# Patient Record
Sex: FEMALE | Race: WHITE | NOT HISPANIC OR LATINO | URBAN - METROPOLITAN AREA
[De-identification: names, ages, dates, MRNs, and addresses within clinical notes are randomized per-mention and may not be internally consistent; named-entity substitution may affect disease eponyms.]

---

## 2018-04-02 ENCOUNTER — EMERGENCY (EMERGENCY)
Facility: HOSPITAL | Age: 38
LOS: 1 days | Discharge: HOME | End: 2018-04-02
Attending: EMERGENCY MEDICINE
Payer: COMMERCIAL

## 2018-04-02 VITALS
HEART RATE: 83 BPM | TEMPERATURE: 97 F | RESPIRATION RATE: 18 BRPM | DIASTOLIC BLOOD PRESSURE: 92 MMHG | SYSTOLIC BLOOD PRESSURE: 135 MMHG | OXYGEN SATURATION: 97 %

## 2018-04-02 LAB
APPEARANCE UR: CLEAR — SIGNIFICANT CHANGE UP
BACTERIA # UR AUTO: ABNORMAL /HPF
BILIRUB UR-MCNC: NEGATIVE — SIGNIFICANT CHANGE UP
COLOR SPEC: YELLOW — SIGNIFICANT CHANGE UP
DIFF PNL FLD: ABNORMAL
EPI CELLS # UR: ABNORMAL /HPF
GLUCOSE UR QL: NEGATIVE — SIGNIFICANT CHANGE UP
KETONES UR-MCNC: NEGATIVE — SIGNIFICANT CHANGE UP
LEUKOCYTE ESTERASE UR-ACNC: ABNORMAL
NITRITE UR-MCNC: NEGATIVE — SIGNIFICANT CHANGE UP
PH UR: 7 — SIGNIFICANT CHANGE UP (ref 5–8)
PROT UR-MCNC: NEGATIVE — SIGNIFICANT CHANGE UP
RBC CASTS # UR COMP ASSIST: SIGNIFICANT CHANGE UP /HPF (ref 0–2)
SP GR SPEC: 1 — LOW (ref 1.01–1.02)
UROBILINOGEN FLD QL: NEGATIVE — SIGNIFICANT CHANGE UP
WBC UR QL: SIGNIFICANT CHANGE UP /HPF (ref 0–5)

## 2018-04-02 PROCEDURE — 99283 EMERGENCY DEPT VISIT LOW MDM: CPT

## 2018-04-02 PROCEDURE — 93005 ELECTROCARDIOGRAM TRACING: CPT

## 2018-04-02 PROCEDURE — 99282 EMERGENCY DEPT VISIT SF MDM: CPT

## 2018-04-02 PROCEDURE — 81001 URINALYSIS AUTO W/SCOPE: CPT

## 2018-04-02 NOTE — ED PROVIDER NOTE - ENMT, MLM
Airway patent, Nasal mucosa clear. Mouth with normal mucosa. Throat has no vesicles, no oropharyngeal exudates and uvula is midline. No thyroid megaly.

## 2018-04-02 NOTE — ED PROVIDER NOTE - MEDICAL DECISION MAKING DETAILS
Pt w/ palpitations, shortness of breath, and subjective fever x 3 days. Negative PERC. Will check rectal temp, labs, urine. Reassess.

## 2018-04-02 NOTE — ED PROVIDER NOTE - CARDIAC, MLM
Normal rate, regular rhythm.  Heart sounds S1, S2.  No murmurs, rubs or gallops. No leg swelling or tenderness.

## 2018-04-02 NOTE — ED PROVIDER NOTE - OBJECTIVE STATEMENT
36 y/o F w/ PMHx of asthma presents c/o palpitations and SOB x 3 days. States that sx started on Friday, went away Saturday, and came back Sunday--pt states that she felt feverish today. Pt is breast feeding, child is 9 months ago, and pt recently had first period since pregnancy. No birth control, no recent travel. Denies leg swelling, calf tenderness, and any other complaints. NKDA.

## 2018-06-28 ENCOUNTER — LABORATORY RESULT (OUTPATIENT)
Age: 38
End: 2018-06-28

## 2018-06-28 ENCOUNTER — APPOINTMENT (OUTPATIENT)
Dept: INTERNAL MEDICINE | Facility: CLINIC | Age: 38
End: 2018-06-28
Payer: COMMERCIAL

## 2018-06-28 VITALS
BODY MASS INDEX: 31.61 KG/M2 | WEIGHT: 161 LBS | HEIGHT: 60 IN | HEART RATE: 93 BPM | TEMPERATURE: 98.2 F | DIASTOLIC BLOOD PRESSURE: 80 MMHG | OXYGEN SATURATION: 97 % | SYSTOLIC BLOOD PRESSURE: 140 MMHG

## 2018-06-28 VITALS — SYSTOLIC BLOOD PRESSURE: 130 MMHG | DIASTOLIC BLOOD PRESSURE: 80 MMHG

## 2018-06-28 DIAGNOSIS — B00.89 OTHER HERPESVIRAL INFECTION: ICD-10-CM

## 2018-06-28 DIAGNOSIS — Z82.49 FAMILY HISTORY OF ISCHEMIC HEART DISEASE AND OTHER DISEASES OF THE CIRCULATORY SYSTEM: ICD-10-CM

## 2018-06-28 DIAGNOSIS — Z78.9 OTHER SPECIFIED HEALTH STATUS: ICD-10-CM

## 2018-06-28 DIAGNOSIS — E65 LOCALIZED ADIPOSITY: ICD-10-CM

## 2018-06-28 DIAGNOSIS — Z86.19 PERSONAL HISTORY OF OTHER INFECTIOUS AND PARASITIC DISEASES: ICD-10-CM

## 2018-06-28 DIAGNOSIS — M77.8 OTHER ENTHESOPATHIES, NOT ELSEWHERE CLASSIFIED: ICD-10-CM

## 2018-06-28 DIAGNOSIS — K42.9 UMBILICAL HERNIA W/OUT OBSTRUCTION OR GANGRENE: ICD-10-CM

## 2018-06-28 DIAGNOSIS — Z82.5 FAMILY HISTORY OF ASTHMA AND OTHER CHRONIC LOWER RESPIRATORY DISEASES: ICD-10-CM

## 2018-06-28 DIAGNOSIS — Z80.8 FAMILY HISTORY OF MALIGNANT NEOPLASM OF OTHER ORGANS OR SYSTEMS: ICD-10-CM

## 2018-06-28 LAB
APPEARANCE: CLEAR
BASOPHILS # BLD AUTO: 0.01 K/UL
BASOPHILS NFR BLD AUTO: 0.1 %
BILIRUBIN URINE: NEGATIVE
BLOOD URINE: ABNORMAL
COLOR: YELLOW
EOSINOPHIL # BLD AUTO: 0.18 K/UL
EOSINOPHIL NFR BLD AUTO: 2.5 %
GLUCOSE QUALITATIVE U: NEGATIVE MG/DL
HBA1C MFR BLD HPLC: 5 %
HCT VFR BLD CALC: 38.7 %
HGB BLD-MCNC: 12.6 G/DL
IMM GRANULOCYTES NFR BLD AUTO: 0.1 %
KETONES URINE: NEGATIVE
LEUKOCYTE ESTERASE URINE: ABNORMAL
LYMPHOCYTES # BLD AUTO: 2.46 K/UL
LYMPHOCYTES NFR BLD AUTO: 34.3 %
MAN DIFF?: NORMAL
MCHC RBC-ENTMCNC: 29.9 PG
MCHC RBC-ENTMCNC: 32.6 GM/DL
MCV RBC AUTO: 91.7 FL
MONOCYTES # BLD AUTO: 0.45 K/UL
MONOCYTES NFR BLD AUTO: 6.3 %
NEUTROPHILS # BLD AUTO: 4.06 K/UL
NEUTROPHILS NFR BLD AUTO: 56.7 %
NITRITE URINE: NEGATIVE
PH URINE: 5.5
PLATELET # BLD AUTO: 177 K/UL
PROTEIN URINE: NEGATIVE MG/DL
RBC # BLD: 4.22 M/UL
RBC # FLD: 12.9 %
SPECIFIC GRAVITY URINE: 1.02
UROBILINOGEN URINE: NEGATIVE MG/DL
WBC # FLD AUTO: 7.17 K/UL

## 2018-06-28 PROCEDURE — 99214 OFFICE O/P EST MOD 30 MIN: CPT | Mod: 25

## 2018-06-28 PROCEDURE — 99385 PREV VISIT NEW AGE 18-39: CPT

## 2018-06-28 RX ORDER — CLOTRIMAZOLE AND BETAMETHASONE DIPROPIONATE 10; .5 MG/G; MG/G
1-0.05 CREAM TOPICAL
Qty: 15 | Refills: 0 | Status: COMPLETED | COMMUNITY
Start: 2018-04-24

## 2018-06-28 NOTE — COUNSELING
[Weight management counseling provided] : Weight management [Healthy eating counseling provided] : healthy eating [Activity counseling provided] : activity [Target Wt Loss Goal ___] : Target weight loss goal [unfilled] lbs [Weight Self Once Weekly] : Weight self once weekly [Keep Food Diary] : Keep food diary [Low Fat Diet] : Low fat diet [Decrease Portions] : Decrease food portions [Low Salt Diet] : Low salt diet [___ min/wk activity recommended] : [unfilled] min/wk activity recommended [Walking] : Walking [Sleep ___ hours/day] : Sleep [unfilled] hours/day [Engage in a relaxing activity] : Engage in a relaxing activity [Plan in advance] : Plan in advance [None] : None [Good understanding] : Patient has a good understanding of lifestyle changes and the steps needed to achieve self management goals [de-identified] : she has a 13 month old and wakes up during night. She recently stopped breast feeding.

## 2018-06-28 NOTE — ASSESSMENT
[FreeTextEntry1] : health maintenance she is up to date with her pap, eye and dental examination.  she is up to date with vaccines.  she will have blood testing today bmi 31  risks of obesity and need for diet and eating healthy Obesity is a complex disorder involving an excessive amount of body fat. Obesity isn't just a cosmetic concern. It increases your risk of diseases and health problems, such as heart disease, diabetes and high blood pressure.\par \par Being extremely obese means you are especially likely to have health problems related to your weight.\par \par \par The good news is that even modest weight loss can improve or prevent the health problems associated with obesity. Dietary changes, increased physical activity and behavior changes can help you lose weight. Prescription medications and weight-loss surgery are additional options for treating obesity.\par \par \par \par \par Symptoms\par \par Obesity is diagnosed when your body mass index (BMI) is 30 or higher. Your body mass index is calculated by dividing your weight in kilograms (kg) by your height in meters (m) squared. \par \par \par BMI\par \par Weight status\par \par \par Below 18.5 Underweight \par 18.5-24.9 Normal \par 25.0-29.9 Overweight \par 30.0-34.9 Obese (Class I) \par 35.0-39.9 Obese (Class II) \par 40.0 and higher Extreme obesity (Class III) \par \par For most people, BMI provides a reasonable estimate of body fat. However, BMI doesn't directly measure body fat, so some people, such as muscular athletes, may have a BMI in the obese category even though they don't have excess body fat. Ask your doctor if your BMI is a problem. \par \par When to see a doctor\par \par If you think you may be obese, and especially if you're concerned about weight-related health problems, see your doctor or health care provider. You and your provider can evaluate your health risks and discuss your weight-loss options. \par \par Request an Appointment at [facility]\par \par Causes\par \par Although there are genetic, behavioral and hormonal influences on body weight, obesity occurs when you take in more calories than you burn through exercise and normal daily activities. Your body stores these excess calories as fat.\par \par Obesity can sometimes be traced to a medical cause, such as Prader-Willi syndrome, Cushing's syndrome, and other diseases and conditions. However, these disorders are rare and, in general, the principal causes of obesity are:\par •Inactivity. If you're not very active, you don't burn as many calories. With a sedentary lifestyle, you can easily take in more calories every day than you use through exercise and normal daily activities.\par •Unhealthy diet and eating habits. Weight gain is inevitable if you regularly eat more calories than you burn. And most Americans' diets are too high in calories and are full of fast food and high-calorie beverages.\par \par Risk factors\par \par Obesity usually results from a combination of causes and contributing factors, including:\par •Genetics. Your genes may affect the amount of body fat you store, and where that fat is distributed. Genetics may also play a role in how efficiently your body converts food into energy and how your body burns calories during exercise.\par •Family lifestyle. Obesity tends to run in families. If one or both of your parents are obese, your risk of being obese is increased. That's not just because of genetics. Family members tend to share similar eating and activity habits.\par •Inactivity. If you're not very active, you don't burn as many calories. With a sedentary lifestyle, you can easily take in more calories every day than you burn through exercise and routine daily activities. Having medical problems, such as arthritis, can lead to decreased activity, which contributes to weight gain.\par •Unhealthy diet. A diet that's high in calories, lacking in fruits and vegetables, full of fast food, and laden with high-calorie beverages and oversized portions contributes to weight gain.\par •Medical problems. In some people, obesity can be traced to a medical cause, such as Prader-Willi syndrome, Cushing's syndrome and other conditions. Medical problems, such as arthritis, also can lead to decreased activity, which may result in weight gain.\par •Certain medications. Some medications can lead to weight gain if you don't compensate through diet or activity. These medications include some antidepressants, anti-seizure medications, diabetes medications, antipsychotic medications, steroids and beta blockers.\par •Social and economic issues. Research has linked social and economic factors to obesity. Avoiding obesity is difficult if you don't have safe areas to exercise. Similarly, you may not have been taught healthy ways of cooking, or you may not have money to buy healthier foods. In addition, the people you spend time with may influence your weight — you're more likely to become obese if you have obese friends or relatives.\par •Age. Obesity can occur at any age, even in young children. But as you age, hormonal changes and a less active lifestyle increase your risk of obesity. In addition, the amount of muscle in your body tends to decrease with age. This lower muscle mass leads to a decrease in metabolism. These changes also reduce calorie needs, and can make it harder to keep off excess weight. If you don't consciously control what you eat and become more physically active as you age, you'll likely gain weight.\par •Pregnancy. During pregnancy, a woman's weight necessarily increases. Some women find this weight difficult to lose after the baby is born. This weight gain may contribute to the development of obesity in women.\par •Quitting smoking. Quitting smoking is often associated with weight gain. And for some, it can lead to enough weight gain that the person becomes obese. In the long run, however, quitting smoking is still a greater benefit to your health than continuing to smoke.\par •Lack of sleep. Not getting enough sleep or getting too much sleep can cause changes in hormones that increase your appetite. You may also crave foods high in calories and carbohydrates, which can contribute to weight gain.\par \par Even if you have one or more of these risk factors, it doesn't mean that you're destined to become obese. You can counteract most risk factors through diet, physical activity and exercise, and behavior changes.\par \par Complications\par \par If you're obese, you're more likely to develop a number of potentially serious health problems, including:\par •High triglycerides and low high-density lipoprotein (HDL) cholesterol\par •Type 2 diabetes\par •High blood pressure\par •Metabolic syndrome — a combination of high blood sugar, high blood pressure, high triglycerides and low HDL cholesterol\par •Heart disease\par •Stroke\par •Cancer, including cancer of the uterus, cervix, endometrium, ovaries, breast, colon, rectum, esophagus, liver, gallbladder, pancreas, kidney and prostate\par •Breathing disorders, including sleep apnea, a potentially serious sleep disorder in which breathing repeatedly stops and starts\par •Gallbladder disease\par •Gynecological problems, such as infertility and irregular periods\par •Erectile dysfunction and sexual health issues\par •Nonalcoholic fatty liver disease, a condition in which fat builds up in the liver and can cause inflammation or scarring\par •Osteoarthritis\par \par Quality of life\par \par When you're obese, your overall quality of life may be diminished. You may not be able to do things you used to do, such as participating in enjoyable activities. You may avoid public places. Obese people may even encounter discrimination.\par \par Other weight-related issues that may affect your quality of life include:\par •Depression\par •Disability\par •Sexual problems\par •Shame and guilt\par •Social isolation\par •Lower work achievement\par \par Prevention\par \par Whether you're at risk of becoming obese, currently overweight or at a healthy weight, you can take steps to prevent unhealthy weight gain and related health problems. Not surprisingly, the steps to prevent weight gain are the same as the steps to lose weight: daily exercise, a healthy diet, and a long-term commitment to watch what you eat and drink.\par •Exercise regularly. You need to get 150 to 300 minutes of moderate-intensity activity a week to prevent weight gain. Moderately intense physical activities include fast walking and swimming.\par •Follow a healthy eating plan. Focus on low-calorie, nutrient-dense foods, such as fruits, veg\par Hypertension We discussed dash diet weight loss exercise.   \par \par DASH diet to lower high blood pressure\par \par \par Email this page to a friend Print [employer] Twitter [employer]+ \par \par \par DASH stands for Dietary Approaches to Stop Hypertension. The DASH diet can help lower high blood pressure and cholesterol and other fats in your blood. It can help lower your risk for heart attack and stroke and help you lose weight. This diet is low in sodium (salt) and rich in nutrients.\par \par \par \par \par \par How DASH Works\par \par \par \par \par \par \par The DASH diet reduces high blood pressure by lowering the amount of sodium in your diet to 2300 milligrams (mg) a day. Lowering sodium to 1500 mg a day reduces blood pressure even more. It also includes variety of foods rich in nutrients that help lower blood pressure, such as potassium, calcium, and magnesium.\par \par On the DASH diet, you will:\par •Get plenty of vegetables, fruits, and fat-free or low-fat dairy\par •Include whole grains, beans, seeds, nuts, and vegetable oils\par •Eat lean meats, poultry, and fish\par •Cut back on salt, red meat, sweets, and sugary drinks\par •Limit alcoholic beverages \par \par You should also get at least 30 minutes of moderate-intensity exercise most days of the week. Examples include brisk walking or riding a bike. Aim to get 2 hours and 30 minutes of exercise per week.\par \par You can follow the DASH diet if you want to prevent high blood pressure. It can also help you lose extra weight. Most people can benefit from lowering sodium intake to 2300 milligrams (mg) a day.\par \par Your health care provider may suggest cutting back to 1500 mg a day if you:\par •Already have high blood pressure\par •Have diabetes or chronic kidney disease\par •Are \par •Are age 51 or older \par \par If you take medicine to treat high blood pressure, do not stop taking your medicine while on the DASH diet. Be sure to tell your provider you are following the DASH diet.\par \par \par \par \par \par How to get Started\par \par \par \par \par \par \par On the DASH diet, you can eat foods from all food groups. But you eat more of the foods that are naturally low in salt, cholesterol, and saturated fats. You will also include foods that are high in potassium, calcium, magnesium, and fiber.\par \par Here's a list of the food groups and how many servings of each you should have per day. For a diet that has 2000 calories per day, you should eat:\par •Vegetables (4 to 5 servings a day)\par •Fruits (4 to 5 servings a day)\par •Low-fat or fat-free dairy products, such as milk and yogurt (2 to 3 servings a day)\par •Whole grains (7 to 8 servings a day, and 3 should be whole grains)\par •Fish, lean meats, and poultry (2 servings or less a day)\par •Beans, seeds, and nuts (4 to 5 servings a week)\par •Fats and oils (2 to 3 servings a day)\par •Sweets or added sugars, such as jelly, hard candy, maple syrup, sorbet, and sugar (fewer than 5 servings a week) \par \par The number of servings you have each day depends on how many calories you need.\par •If you're trying to lose weight, you may need fewer servings than listed.\par •If you are not very active, aim for the lower number of servings listed.\par •If you are moderately active, have the higher number of servings.\par •If you are very active, you may need more servings than listed.\par \par Your provider can help find the right number of servings a day for you.\par \par \par \par \par \par Know your Serving Sizes\par \par \par \par \par \par \par To know how much to eat, you need to know serving sizes. Below are sample servings for each food group.\par \par Vegetables:\par •1 cup (70 grams) raw leafy vegetables\par •½ cup (90 grams) chopped raw or cooked vegetables \par \par Fruits:\par •1 medium fruit (6 ounces or 168 grams)\par •½ cup (70 grams) fresh, frozen, or canned fruit\par •¼ cup (25 grams) dried fruit \par \par Fat-free or low-fat dairy products:\par •1 cup (240 milliliters) milk or yogurt\par •1½ ounce (oz) or 50 grams (g) cheese \par \par Whole grains (Aim to make all of your grain choices whole grain. Whole grain products contain more fiber and protein than "refined" grain products.):\par •1 slice bread\par •½ cup (80 grams) cooked rice, pasta, or cereal \par \par Lean meats, poultry, and fish:\par •3 oz (85 g) of cooked fish, lean meat, or poultry \par \par Nuts, seeds, and legumes:\par •½ cup (90 grams) cooked legumes (dried beans, peas)\par •1/3 cup (45 grams) nuts\par •1 tablespoon (10 grams) seeds \par \par Fats and oils:\par •1 teaspoon (5 milliliters) vegetable oil\par •2 tablespoons (30 grams) low-fat salad dressing\par •1 teaspoon (5 grams) soft margarine \par \par Sweets and added sugars:\par •1 tablespoon (15 grams) sugar\par •1 tablespoon (15 grams) jelly or jam\par •½ cup (70 grams) sorbet, gelatin dessert \par \par \par \par \par \par Tips for Following DASH\par \par \par \par \par \par \par It's easy to follow the DASH diet. But it might mean making some changes to how you currently eat. To get started:\par •DO NOT try to make changes all at once. It's fine to change your eating habits gradually.\par •To add vegetables to your diet, try having a salad at lunch. Or, add cucumber, lettuce, shredded carrots, or tomatoes to your sandwiches.\par •There should always been something green on your plate. It's fine to use frozen vegetables instead of fresh. Just make sure the package does not contain added salt or fat.\par •Add sliced fruit to your cereal or oatmeal for breakfast.\par •For dessert, choose fresh fruit or low-fat frozen yogurt instead of high-calorie sweets, such as cakes or pies.\par •Choose healthy snacks, such as unsalted rice cakes or popcorn, raw vegetables, or yogurt. Dried fruits, seeds, and nuts also make great snack choices. Just keep these portions small.\par •Think of meat as part of your meal, instead of the main course. Limit your servings of lean meat to 6 ounces (170 grams) a day. You can have two 3-ounce (85 grams) servings during the day.\par •Try cooking without meat at least twice each week. Instead, eat beans, nuts, tofu, or eggs for your protein.\par \par \par \par \par \par Tips to Lower Your Salt\par \par \par \par \par \par \par To lower the amount of salt in your diet:\par •Take the saltshaker off the table.\par •Flavor your food with herbs and spices instead of salt. Lemon, lime, and vinegar also add flavor.\par •Avoid canned foods and frozen entrees. They are often high in salt. When you make things from scratch you have more control over how much salt goes in them.\par •Check all food labels for sodium. You may be surprised at how much you find, and where you find it. Frozen dinners, soups, salad dressings, and prepared foods often have a lot of sodium.\par •Choose foods that contain less than 5% for the daily value of sodium.\par •Look for low-sodium versions of foods when you can find them.\par •Limit foods and condiments that have a lot of salt, such as pickles, olives, cured meats, ketchup, soy sauce, mustard, and barbeque sauce.\par •When dining out, ask that your food be made with no added salt or MSG. \par \par \par \par \par \par Where to get More Information\par \par There are many books about the DASH diet plan to help you get started. These books can also provide sample meal plans and recipe ideas.\par hoarseness of voice test will test her for thyroid disease and allergies.   buffalo hump could be fat or due to hormone disturbance ie cushings.  will also check her bloods sugars.  umbilical hernia we discussed signs to look for  blue discoloration pain not able to reduce.    herpes  type 1 lysine 500mg od.

## 2018-06-28 NOTE — PHYSICAL EXAM
[Well Developed] : well developed [Well Nourished] : well nourished [Harsh] : was harsh [Conjunctiva] : the conjunctiva were normal in both eyes [PERRL] : pupils were equal in size, round, and reactive to light [EOM Intact] : extraocular movements were intact [Normal Outer Ear/Nose] : the outer ears and nose were normal in appearance [Normal Oropharynx] : the oropharynx was normal [Normal TMs] : both tympanic membranes were normal [Normal Nasal Mucosa] : the nasal mucosa was normal [Normal Appearance] : was normal in appearance [Neck Supple] : was supple [Enlarged Diffusely] : was not enlarged [___] : a single ~M [unfilled] ~Ucm nodule palpated on the right lobe of the thyroid [Rate ___] : at [unfilled] breaths per minute [Normal Rhythm/Effort] : normal respiratory rhythm and effort [Clear Bilaterally] : the lungs were clear to auscultation bilaterally [Normal to Percussion] : the lungs were normal to percussion [Heart Rate ___] : [unfilled] bpm [Normal Rate] : normal [Normal S1] : normal S1 [Normal S2] : normal S2 [S3] : no S3 [S4] : no S4 [No Murmur] : no murmurs heard [No Pitting Edema] : no pitting edema present [Rt] : no varicose veins of the right leg [Lt] : no varicose veins of the left leg [Right Carotid Bruit] : no bruit heard over the right carotid [Left Carotid Bruit] : no bruit heard over the left carotid [Right Femoral Bruit] : no bruit heard over the right femoral artery [Left Femoral Bruit] : no bruit heard over the left femoral artery [2+] : left 2+ [No Abnormalities] : the abdominal aorta was not enlarged and no bruit was heard [Bruit] : no bruit heard [Examination Of The Breasts] : a normal appearance [Breast Abnormal Lactation (Galactorrhea)] : galactorrhea [Soft, Nontender] : the abdomen was soft and nontender [No Mass] : no masses were palpated [No HSM] : no hepatosplenomegaly noted [Abdomen Hernia Umbilical] : an umbilical hernia was present [] : which was reducible [Postauricular Lymph Nodes Enlarged Bilaterally] : nodes not enlarged [Preauricular Lymph Nodes Enlarged Bilaterally] : nodes not enlarged [Submandibular Lymph Nodes Enlarged Bilaterally] : nodes not enlarged [Suboccipital Lymph Nodes Enlarged Bilaterally] : nodes not enlarged [Submental Lymph Nodes Enlarged] : nodes not enlarged [Cervical Lymph Nodes Enlarged Posterior Bilaterally] : nodes not enlarged [Cervical Lymph Nodes Enlarged Anterior Bilaterally] : nodes not enlarged [Supraclavicular Lymph Nodes Enlarged Bilaterally] : nodes not enlarged [Axillary Lymph Nodes Enlarged Bilaterally] : nodes not enlarged [Epitrochlear Lymph Nodes Enlarged Bilaterally] : nodes not enlarged [Femoral Lymph Nodes Enlarged Bilaterally] : nodes not enlarged [Inguinal Lymph Nodes Enlarged Bilaterally] : nodes not enlarged [No Lymphangitis] : no lymphangitis observed [Normal Kyphosis] : normal kyphosis [No Visual Abnormalities] : no visible abnormalities [Normal Lordosis] : normal lordosis [No Scoliosis] : no scoliosis [No Tenderness to Palpation] : no spine tenderness on palpation [No Masses] : no masses [Full ROM] : full ROM [No Pain with ROM] : no pain with motion in any direction [Intact] : all reflexes within normal limits bilaterally [Normal Station and Gait] : the gait and station were normal [Normal Motor Tone] : the muscle tone was normal [Involuntary Movements] : no involuntary movements were seen [Normal Scalp] : inspection of the scalp showed no abnormalities [Examination Of The Hair] : texture and distribution of hair was normal [Abnormal Color] : normal color and pigmentation [Complexion Medium] : medium complexion [Skin Lesions 1] : no skin lesions were observed [Skin Turgor Decreased] : normal skin turgor [Normal] : the deep tendon reflexes were normal [Normal Mental Status] : the patient's orientation, memory, attention, language and fund of knowledge were normal [Appropriate] : appropriate [Impaired judgment] : intact judgment [Impaired Insight] : intact insight [de-identified] : normal tongue  teeth dicoration of front teeth and no tonsils seen

## 2018-06-28 NOTE — HEALTH RISK ASSESSMENT
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 [[id number]] : 0 [Patient reported PAP Smear was abnormal] : Patient reported PAP Smear was abnormal [HIV Test offered] : HIV Test offered [Change in mental status noted] : No change in mental status noted [Language] : denies difficulty with language [Behavior] : denies difficulty with behavior [Learning/Retaining New Information] : denies difficulty learning/retaining new information [Handling Complex Tasks] : denies difficulty handling complex tasks [Reasoning] : denies difficulty with reasoning [Spatial Ability and Orientation] : denies difficulty with spatial ability and orientation [None] : None [With Family] : lives with family [# of Members in Household ___] :  household currently consist of [unfilled] member(s) [Employed] : employed [College] : College [] :  [# Of Children ___] : has [unfilled] children [Sexually Active] : sexually active [Feels Safe at Home] : Feels safe at home [Fully functional (bathing, dressing, toileting, transferring, walking, feeding)] : Fully functional (bathing, dressing, toileting, transferring, walking, feeding) [Fully functional (using the telephone, shopping, preparing meals, housekeeping, doing laundry, using] : Fully functional and needs no help or supervision to perform IADLs (using the telephone, shopping, preparing meals, housekeeping, doing laundry, using transportation, managing medications and managing finances) [Reports changes in hearing] : Reports no changes in hearing [Reports changes in vision] : Reports no changes in vision [Reports changes in dental health] : Reports no changes in dental health [Smoke Detector] : smoke detector [Carbon Monoxide Detector] : carbon monoxide detector [Guns at Home] : no guns at home [Safety elements used in home] : safety elements used in home [Seat Belt] :  uses seat belt [Sunscreen] : uses sunscreen [Travel to Developing Areas] : does not  travel to developing areas [TB Exposure] : is not being exposed to tuberculosis [Caregiver Concerns] : does not have caregiver concerns [PapSmearDate] : 2/18 [PapSmearComments] : culposcopy [FreeTextEntry2] :  for [facility] [de-identified] : last eye exam 2018  [de-identified] : last dental 8 months ago [Discussed at today's visit] : Advance Directives Discussed at today's visit

## 2018-06-28 NOTE — PAST MEDICAL HISTORY
[Menstruating] : menstruating [Menarche Age ____] : age at menarche was [unfilled] [Definite ___ (Date)] : the last menstrual period was [unfilled] [Normal Amount/Duration] : it was of a normal amount and duration [Regular Cycle Intervals] : have been regular [Total Preg ___] : G[unfilled] [Live Births ___] : P[unfilled]  [Full Term ___] : Full Term: [unfilled] [Living ___] : Living: [unfilled]

## 2018-07-04 LAB
25(OH)D3 SERPL-MCNC: 20 NG/ML
A ALTERNATA IGE QN: 0.1 KUA/L
A FUMIGATUS IGE QN: 0.19 KUA/L
ACTH SER-ACNC: 6 PG/ML
ADJUSTED MITOGEN: >10 IU/ML
ADJUSTED TB AG: -0.01 IU/ML
ALBUMIN SERPL ELPH-MCNC: 4.4 G/DL
ALDOSTERONE SERUM: 8.6 NG/DL
ALP BLD-CCNC: 43 U/L
ALT SERPL-CCNC: 8 U/L
ANION GAP SERPL CALC-SCNC: 15 MMOL/L
AST SERPL-CCNC: 16 U/L
BARLEY IGE QN: 0.89 KUA/L
BERMUDA GRASS IGE QN: 1.81 KUA/L
BILIRUB SERPL-MCNC: 0.3 MG/DL
BOXELDER IGE QN: 1.66 KUA/L
BUN SERPL-MCNC: 11 MG/DL
C HERBARUM IGE QN: 0.11 KUA/L
CALCIUM SERPL-MCNC: 9.6 MG/DL
CALIF WALNUT IGE QN: 5.12 KUA/L
CAT DANDER IGE QN: 3.92 KUA/L
CHERRY IGE QN: 0.26 KUA/L
CHLORIDE SERPL-SCNC: 102 MMOL/L
CHOLEST SERPL-MCNC: 184 MG/DL
CHOLEST/HDLC SERPL: 2.7 RATIO
CMN PIGWEED IGE QN: 0.16 KUA/L
CO2 SERPL-SCNC: 23 MMOL/L
COMMON RAGWEED IGE QN: 3.88 KUA/L
CORTIS SERPL-MCNC: 8.4 UG/DL
CORTIS SERPL-MCNC: 8.8 UG/DL
COTTONWOOD IGE QN: 0.32 KUA/L
COW MILK IGE QN: 0.4 KUA/L
CRAB IGE QN: 0.64 KUA/L
CREAT SERPL-MCNC: 0.69 MG/DL
D FARINAE IGE QN: 20.8 KUA/L
D PTERONYSS IGE QN: 20.1 KUA/L
DEPRECATED A ALTERNATA IGE RAST QL: NORMAL
DEPRECATED A FUMIGATUS IGE RAST QL: NORMAL
DEPRECATED BARLEY IGE RAST QL: ABNORMAL
DEPRECATED BERMUDA GRASS IGE RAST QL: ABNORMAL
DEPRECATED BOXELDER IGE RAST QL: ABNORMAL
DEPRECATED C HERBARUM IGE RAST QL: NORMAL
DEPRECATED CAT DANDER IGE RAST QL: ABNORMAL
DEPRECATED CHERRY IGE RAST QL: NORMAL
DEPRECATED COMMON PIGWEED IGE RAST QL: NORMAL
DEPRECATED COMMON RAGWEED IGE RAST QL: ABNORMAL
DEPRECATED COTTONWOOD IGE RAST QL: NORMAL
DEPRECATED COW MILK IGE RAST QL: ABNORMAL
DEPRECATED CRAB IGE RAST QL: ABNORMAL
DEPRECATED D FARINAE IGE RAST QL: ABNORMAL
DEPRECATED D PTERONYSS IGE RAST QL: ABNORMAL
DEPRECATED DOG DANDER IGE RAST QL: ABNORMAL
DEPRECATED EGG WHITE IGE RAST QL: ABNORMAL
DEPRECATED GOOSEFOOT IGE RAST QL: ABNORMAL
DEPRECATED LONDON PLANE IGE RAST QL: ABNORMAL
DEPRECATED MUGWORT IGE RAST QL: ABNORMAL
DEPRECATED OAT IGE RAST QL: ABNORMAL
DEPRECATED P NOTATUM IGE RAST QL: NORMAL
DEPRECATED PEANUT IGE RAST QL: NORMAL
DEPRECATED RED CEDAR IGE RAST QL: ABNORMAL
DEPRECATED ROACH IGE RAST QL: ABNORMAL
DEPRECATED RYE IGE RAST QL: ABNORMAL
DEPRECATED SHEEP SORREL IGE RAST QL: NORMAL
DEPRECATED SILVER BIRCH IGE RAST QL: ABNORMAL
DEPRECATED SOYBEAN IGE RAST QL: ABNORMAL
DEPRECATED TIMOTHY IGE RAST QL: ABNORMAL
DEPRECATED WHEAT IGE RAST QL: ABNORMAL
DEPRECATED WHITE ASH IGE RAST QL: ABNORMAL
DEPRECATED WHITE OAK IGE RAST QL: ABNORMAL
DOG DANDER IGE QN: 2.47 KUA/L
EGG WHITE IGE QN: 0.8 KUA/L
FRUCTOSAMINE SERPL-MCNC: 213 UMOL/L
GLUCOSE SERPL-MCNC: 118 MG/DL
GOOSEFOOT IGE QN: 0.49 KUA/L
HBV SURFACE AB SER QL: REACTIVE
HBV SURFACE AG SER QL: NONREACTIVE
HDLC SERPL-MCNC: 69 MG/DL
HIV1+2 AB SPEC QL IA.RAPID: NONREACTIVE
LDLC SERPL CALC-MCNC: 96 MG/DL
LONDON PLANE IGE QN: 0.41 KUA/L
M TB IFN-G BLD-IMP: NEGATIVE
MEV IGG FLD QL IA: >300 AU/ML
MEV IGG+IGM SER-IMP: POSITIVE
MUGWORT IGE QN: 0.72 KUA/L
MULBERRY (T70) CLASS: ABNORMAL
MULBERRY (T70) CONC: 0.38 KUA/L
MUV AB SER-ACNC: POSITIVE
MUV IGG SER QL IA: >300 AU/ML
OAT IGE QN: 1.33 KUA/L
P NOTATUM IGE QN: 0.23 KUA/L
PEANUT IGE QN: 0.33 KUA/L
POTASSIUM SERPL-SCNC: 3.6 MMOL/L
PROT SERPL-MCNC: 7.7 G/DL
QUANTIFERON GOLD NIL: 0.05 IU/ML
RED CEDAR IGE QN: 0.35 KUA/L
ROACH IGE QN: 0.42 KUA/L
RUBV IGG FLD-ACNC: 16.6 INDEX
RUBV IGG SER-IMP: POSITIVE
RYE IGE QN: 0.89 KUA/L
SHEEP SORREL IGE QN: 0.18 KUA/L
SILVER BIRCH IGE QN: 1.71 KUA/L
SODIUM SERPL-SCNC: 140 MMOL/L
SOYBEAN IGE QN: 0.71 KUA/L
T PALLIDUM AB SER QL IA: NEGATIVE
TIMOTHY IGE QN: 3.71 KUA/L
TOTAL IGE SMQN RAST: 1161 KU/L
TREE ALLERG MIX1 IGE QL: ABNORMAL
TRIGL SERPL-MCNC: 93 MG/DL
TSH SERPL-ACNC: 0.9 UIU/ML
VZV AB TITR SER: POSITIVE
VZV IGG SER IF-ACNC: 2562 INDEX
WHEAT IGE QN: 1.04 KUA/L
WHITE ASH IGE QN: 14.2 KUA/L
WHITE ELM IGE QN: 0.77 KUA/L
WHITE ELM IGE QN: ABNORMAL
WHITE OAK IGE QN: 4.35 KUA/L

## 2018-07-06 ENCOUNTER — TRANSCRIPTION ENCOUNTER (OUTPATIENT)
Age: 38
End: 2018-07-06

## 2018-07-12 ENCOUNTER — FORM ENCOUNTER (OUTPATIENT)
Age: 38
End: 2018-07-12

## 2018-07-13 ENCOUNTER — APPOINTMENT (OUTPATIENT)
Dept: ULTRASOUND IMAGING | Facility: HOSPITAL | Age: 38
End: 2018-07-13
Payer: COMMERCIAL

## 2018-07-13 ENCOUNTER — OUTPATIENT (OUTPATIENT)
Dept: OUTPATIENT SERVICES | Facility: HOSPITAL | Age: 38
LOS: 1 days | End: 2018-07-13
Payer: COMMERCIAL

## 2018-07-13 DIAGNOSIS — E04.1 NONTOXIC SINGLE THYROID NODULE: ICD-10-CM

## 2018-07-13 PROCEDURE — 76536 US EXAM OF HEAD AND NECK: CPT | Mod: 26

## 2018-07-13 PROCEDURE — 76536 US EXAM OF HEAD AND NECK: CPT

## 2018-08-07 ENCOUNTER — LABORATORY RESULT (OUTPATIENT)
Age: 38
End: 2018-08-07

## 2018-08-07 ENCOUNTER — APPOINTMENT (OUTPATIENT)
Dept: INTERNAL MEDICINE | Facility: CLINIC | Age: 38
End: 2018-08-07
Payer: COMMERCIAL

## 2018-08-07 VITALS
HEIGHT: 60 IN | DIASTOLIC BLOOD PRESSURE: 86 MMHG | OXYGEN SATURATION: 97 % | TEMPERATURE: 98.3 F | WEIGHT: 165 LBS | SYSTOLIC BLOOD PRESSURE: 116 MMHG | BODY MASS INDEX: 32.39 KG/M2 | HEART RATE: 98 BPM

## 2018-08-07 DIAGNOSIS — R49.0 DYSPHONIA: ICD-10-CM

## 2018-08-07 PROBLEM — J45.909 UNSPECIFIED ASTHMA, UNCOMPLICATED: Chronic | Status: ACTIVE | Noted: 2018-04-02

## 2018-08-07 PROCEDURE — 99214 OFFICE O/P EST MOD 30 MIN: CPT

## 2018-08-07 RX ORDER — POLYMYXIN B SULFATE AND TRIMETHOPRIM 10000; 1 [USP'U]/ML; MG/ML
10000-0.1 SOLUTION OPHTHALMIC
Qty: 10 | Refills: 0 | Status: COMPLETED | COMMUNITY
Start: 2018-01-10 | End: 2018-08-07

## 2018-08-07 RX ORDER — SULFAMETHOXAZOLE AND TRIMETHOPRIM 800; 160 MG/1; MG/1
800-160 TABLET ORAL
Qty: 14 | Refills: 0 | Status: COMPLETED | COMMUNITY
Start: 2018-04-14 | End: 2018-08-07

## 2018-08-07 NOTE — COUNSELING
[Healthy eating counseling provided] : healthy eating [None] : None [Good understanding] : Patient has a good understanding of lifestyle changes and the steps needed to achieve self management goals

## 2018-08-07 NOTE — PHYSICAL EXAM
[No Acute Distress] : no acute distress [Well Nourished] : well nourished [Well Developed] : well developed [Well-Appearing] : well-appearing [Normal Sclera/Conjunctiva] : normal sclera/conjunctiva [PERRL] : pupils equal round and reactive to light [EOMI] : extraocular movements intact [Normal Outer Ear/Nose] : the outer ears and nose were normal in appearance [Normal Oropharynx] : the oropharynx was normal [No JVD] : no jugular venous distention [Supple] : supple [No Lymphadenopathy] : no lymphadenopathy [No Respiratory Distress] : no respiratory distress  [Clear to Auscultation] : lungs were clear to auscultation bilaterally [No Accessory Muscle Use] : no accessory muscle use [Normal Rate] : normal rate  [Regular Rhythm] : with a regular rhythm [Normal S1, S2] : normal S1 and S2 [No Varicosities] : no varicosities [Pedal Pulses Present] : the pedal pulses are present [No Edema] : there was no peripheral edema [No Extremity Clubbing/Cyanosis] : no extremity clubbing/cyanosis [Soft] : abdomen soft [Non Tender] : non-tender [Non-distended] : non-distended [No Masses] : no abdominal mass palpated [No HSM] : no HSM [Normal Bowel Sounds] : normal bowel sounds [Normal Posterior Cervical Nodes] : no posterior cervical lymphadenopathy [Normal Anterior Cervical Nodes] : no anterior cervical lymphadenopathy [No CVA Tenderness] : no CVA  tenderness [No Spinal Tenderness] : no spinal tenderness [No Joint Swelling] : no joint swelling [Grossly Normal Strength/Tone] : grossly normal strength/tone [No Rash] : no rash [Normal Gait] : normal gait [Coordination Grossly Intact] : coordination grossly intact [No Focal Deficits] : no focal deficits [Deep Tendon Reflexes (DTR)] : deep tendon reflexes were 2+ and symmetric [Speech Grossly Normal] : speech grossly normal [Normal Affect] : the affect was normal [Normal Insight/Judgement] : insight and judgment were intact

## 2018-08-07 NOTE — HISTORY OF PRESENT ILLNESS
[FreeTextEntry1] : results vit D def  [de-identified] : pt is feeling well.  she is taking her vitamine D and is feeling well.  She is on vacation and resting and has no complaints to discuss today.  Pt has multiple allergies and we discussed her allergies and food allergies and environmental allergies.

## 2018-08-07 NOTE — PLAN
[FreeTextEntry1] : vit DVitamin D plays an important role in many places throughout the body, including the development and calcification of the bones.\par \par Adequate exposure to sunlight and the use of dairy products with vitamin D have significantly reduced the incidence of vitamin D deficiency. However, vitamin D deficiency is still a common problem in many populations, particularly older adults.\par \par This topic reviews the major causes of vitamin D deficiency, including how it is diagnosed and treated, and safe ways to prevent vitamin D deficiency.\par \par WHAT IS VITAMIN D? — Vitamin D is an oil-soluble vitamin that has several important functions in the body:\par \par ?It helps to absorb dietary calcium and phosphorus from the intestines.\par \par \par ?It suppresses the release of parathyroid hormone, a hormone that causes bone resorption.\par \par \par Through these actions, vitamin D keeps the calcium and phosphate levels in the blood normal, thereby promoting bone health. Vitamin D may have other benefits, such as improving muscle and immune function, but these areas require more research.\par \par Natural sources of vitamin D — Vitamin D is made in the skin under the influence of sunlight. The amount of sunlight needed to synthesize adequate amounts of vitamin D varies, depending upon the person's age, skin color, sun exposure, and underlying medical problems. The production of vitamin D from the skin decreases with age. In addition, people who have darker skin need more sun exposure to produce adequate amounts of vitamin D, especially during the winter months.\par \par Another important source of vitamin D is foods, where it may occur naturally (in fatty fish, cod liver oil, and [to a lesser extent] eggs). In the [place], commercially fortified cow's milk is the largest source of dietary vitamin D, containing approximately 100 international units of vitamin D per 8 ounces. Vitamin D intake can be estimated by multiplying the number of cups of milk consumed per day by 100 (two cups milk = 200 international units vitamin D). In other parts of the world, cereals and bread products are often fortified with vitamin D.\par \par Although vitamin D is found in cod liver oil, some fish oils also contain high doses of vitamin A. Excessive vitamin A intake can be associated with side effects, including liver damage and fractures.\par \par CAUSES OF VITAMIN D DEFICIENCY — The main reasons for low levels of vitamin D are:\par \par ?Lack of vitamin D in the diet, often in conjunction with inadequate sun exposure\par \par ?Inability to absorb vitamin D from the intestines\par \par ?Inability to process vitamin D due to kidney or liver disease\par \par \par Inadequate intake — Infants, children, and older adults are at risk for low vitamin D levels because of inadequate vitamin D intake. Human breast milk contains low levels of vitamin D, and most infant formulas do not contain adequate vitamin D. Older adults often do not consume enough vitamin D rich foods, and even when they do, absorption may be limited.\par \par Inadequate sun exposure — Parents of infants and children are often advised to keep their child out of the sun, which reduces vitamin D synthesis from the skin. Exposure to the sun is not recommended as a source of vitamin D for infants and children, due to the potential long-term risks of skin cancer. (See "Patient education: Sunburn (Beyond the Basics)".)\par \par Adults who have limited sun exposure are also at increased risk of vitamin D deficiency, especially if their skin is dark. In addition, reduced amounts of vitamin D are made in the skin and stored in the body as we age. This is especially true in the winter months in some northern areas, such as [place], [place] and [facility], where the skin virtually ceases to produce vitamin D between October and April. In the summer months, the use of sunscreen limits vitamin D synthesis.\par \par Diseases or surgery that affect fat absorption — Certain diseases affect the body's ability to absorb adequate amounts of vitamin D through the intestinal tract. Examples of these include celiac disease, Crohn's disease, and cystic fibrosis.\par \par Surgery that removes or bypasses portions of the stomach or intestines can also lead to low vitamin D levels. An example of this type of surgery is gastric bypass. (See "Patient education: Weight loss surgery and procedures (Beyond the Basics)".)\par \par Kidney and liver disease — The liver and kidney have important enzymes that change vitamin D from sun-exposed skin or food to the biologically active form of vitamin D. People with chronic kidney and liver disease are at increased risk of low active vitamin D levels because they have decreased levels of these enzymes.\par \par Less common causes of vitamin D deficiency include familial or acquired diseases that impair the enzymes in the liver or kidney that create the biologically active form of the vitamin. This results in inadequate amounts of active vitamin D.\par \par POTENTIAL COMPLICATIONS OF VITAMIN D DEFICIENCY — The most serious complications of vitamin D deficiency are low blood calcium (hypocalcemia), low blood phosphate (hypophosphatemia), rickets (softening of the bones during childhood), and osteomalacia (softening of the bones in adults). However, these complications have become less common over time because many foods and drinks have added vitamin D.\par \par "Subclinical" vitamin D deficiency or vitamin D insufficiency is common and is defined as a lower than normal vitamin D level that has no visible signs or symptoms. However, vitamin D insufficiency is often associated with reduced gastrointestinal calcium absorption, decreased bone density (osteopenia or osteoporosis), and, in some cases, a mild decrease of the blood calcium level, elevated parathyroid hormone (which accelerates bone resorption), an increased risk of falls, and possibly fractures, all of which can seriously affect a person's quality of life.\par \par Thus, identifying and treating vitamin D insufficiency or deficiency is important to maintain bone strength. Treatment may even improve the health of other body systems, such as the immune, muscular, and cardiovascular systems, although more research is needed in these areas.\par \par DIAGNOSIS OF VITAMIN D DEFICIENCY — A low vitamin D level can be diagnosed with a blood test called 25-hydroxyvitamin D or 25(OH)D (OH = hydroxy, D = vitamin D). Although there is no formal definition of vitamin D deficiency, some groups use the following values in adults:\par \par ?A normal level of vitamin D is defined as a 25(OH)D concentration greater than 30 ng/mL (75 nmol/L)\par \par \par ?Vitamin D insufficiency is defined as a 25(OH)D concentration of 20 to 30 ng/mL (50 to 75 nmol/L)\par \par \par ?Vitamin D deficiency is defined as a 25(OH)D level less than 20 ng/mL (50 nmol/L)\par \par \par Although there are differences of opinion regarding the 25(OH)D levels that define vitamin D insufficiency and deficiency, most experts agree that levels lower than 20 ng/mL (50 nmol/L) are suboptimal for skeletal health.\par \par Who needs testing for vitamin D? — Testing for vitamin D deficiency or insufficiency is not recommended for everyone but may be advised for people who are home bound or in a long-term care facility (eg, nursing home); if the person has a medical condition that increases the risk of vitamin D deficiency or insufficiency; and for anyone with osteoporosis or a past history of a low-trauma fracture (eg, fracture after fall from standing), low blood calcium (hypocalcemia), or phosphate (hypophosphatemia). (See "Patient education: Bone density testing (Beyond the Basics)" and "Patient education: Osteoporosis prevention and treatment (Beyond the Basics)".)\par \par TREATMENT OF VITAMIN D DEFICIENCY\par \par Vitamin D supplements — There are many types of vitamin D preparations available for the treatment of vitamin D deficiency or insufficiency. The two commonly available forms of vitamin D supplements are ergocalciferol (vitamin D2) and cholecalciferol (vitamin D3). We suggest vitamin D3 when possible, rather than vitamin D2, because vitamin D3 is the naturally occurring form of the vitamin and it may raise vitamin D levels more effectively.\par \par Dosing — The recommended dose of vitamin D depends upon the nature and severity of the vitamin D deficiency.\par \par In people who do not have problems absorbing vitamin D:\par \par ?In people whose 25-hydroxyvitamin D (25[OH]D) is <10 ng/mL (25 nmol/L), treatment usually includes 50,000 international units of vitamin D2 or D3 by mouth once or more per week for six to eight weeks, and then 800 to 1000 (or more) international units of vitamin D3 daily thereafter.\par \par \par ?In people whose 25(OH)D is 10 to 20 ng/mL (25 to 50 nmol/L), treatment usually includes 800 to 1000 international units of vitamin D3 by mouth daily, usually for a three-month period. However, many individuals will need higher doses. The "ideal" dose of vitamin D is determined by testing the individual's 25(OH)D level and increasing the vitamin D dose if the level is not within normal limits. Once a normal level is achieved, continued therapy with 800 international units of vitamin D per day is usually recommended.\par \par \par ?In people whose 25(OH)D is 20 to 30 ng/mL (50 to 75 nmol/L), treatment with 600 to 800 international units of vitamin D3 by mouth daily may be sufficient to maintain levels in the target range.\par \par \par ?In infants and children whose 25(OH)D is <20 ng/mL (50 nmol/L), treatment usually includes 1000 to 5000 international units of vitamin D2 by mouth per day (depending on the age of the child) for two to three months.\par \par \par In people who have diseases or conditions that prevent them from absorbing vitamin D normally (eg, kidney or liver disease), the recommended dose of vitamin D will be determined on an individual basis.\par \par In people whose vitamin D level is normal (>30 ng/mL [=75 nmol/L]), a dose of 800 international units of vitamin D per day is usually recommended. (See 'Prevention of vitamin D deficiency' below.)\par \par Do I need other vitamins or minerals? — During treatment for vitamin D deficiency, it is important to consume at least 1000 mg of calcium per day for premenopausal women and men and 1200 mg per day for postmenopausal women.\par \par Calcium can be found in food sources (table 1) or dietary supplements (table 2). (See "Patient education: Calcium and vitamin D for bone health (Beyond the Basics)".)\par \par Monitoring — A blood test is recommended to monitor blood levels of 25(OH)D three months after beginning treatment. The dose of vitamin D may need to be adjusted based on these results and subsequent blood levels of 25(OH)D obtained to assure that normal levels result from the adjusted dose.\par \par Side effects — Side effects of vitamin D are uncommon unless the 25(OH)D level becomes very elevated (>100 ng/mL or 250 mmol/L) and the person is taking high dose calcium supplements. However, it is important to follow dosing instructions closely and to avoid taking multiple products that contain vitamin D (eg, multivitamin and vitamin D).\par \par If 25(OH)D levels do become very elevated, complications such as high blood calcium levels or kidney stones can develop.\par \par PREVENTION OF VITAMIN D DEFICIENCY — As mentioned previously, the amount of vitamin D you need per day to maintain a normal level of 25-hydroxyvitamin D (25[OH]D) depends upon your skin color, sun exposure, diet, and underlying medical conditions.\par \par In general, adults are advised to take a supplement containing 800 international units of vitamin D per day to maintain a normal vitamin D level. Older people who are confined indoors may have vitamin D deficiency even at this intake level. (See 'Vitamin D supplements' above.)\par hpn - more controlled .  could be due to her decreased vit d

## 2018-08-07 NOTE — ASSESSMENT
[FreeTextEntry1] : Pt has multiple allergies and we discussed her allergies and prevention and the level of reactions.  she states she had reaction to eggplant and nuts and shell fish as well.  WHAT IS ORAL ALLERGY SYNDROME? — Oral allergy syndrome (OAS), which is also called pollen-food allergy syndrome (PFAS or PFS), is a type of food allergy caused by uncooked fruits, raw vegetables, spices, and nuts. The most common symptom is itching of the mouth and throat, which begins quickly after a food is put in the mouth and usually lasts only a few minutes after the food is swallowed. OAS is seen in people who have allergies to pollens and is caused by allergens in fruits, vegetables, and nuts that are very similar to allergens in pollen.\par \par This topic will focus on the cause, diagnosis, and treatment of OAS.\par \par ORAL ALLERGY SYNDROME SYMPTOMS — The symptoms of OAS are:\par \par ?Itching and tingling of the mouth, throat, and sometimes lips\par \par ?Slight swelling and bumpiness of the mouth, throat, or lips\par \par \par The symptoms of OAS usually begin a few minutes after you start eating the problematic food. Once you swallow the food, the symptoms usually go away within a few minutes. Most people stop eating the food because these symptoms are uncomfortable and unpleasant. Occasionally, the symptoms are so mild that people continue to eat the food, but this is less common.\par \par Other symptoms that do not affect the mouth and throat are less common:\par \par ?Some people develop itching, redness, or slight swelling of the hands if they peel or handle peeled raw fruits or vegetables that cause OAS, such as mangoes, apples, or white potatoes.\par \par \par ?About 10 percent of people with OAS experience nausea or stomach upset. An even smaller number of people, probably fewer than 5 percent, develop more serious whole body allergic reactions, such as throat tightness, chest tightness, difficulty breathing, nausea, vomiting, diarrhea, or loss of consciousness [1]. (See 'When to seek help' below.)\par \par \par ?There are some factors that can make an OAS reaction (or any type of allergic food reaction) more severe if present around the same time the food is eaten. The factors include eating a very large amount of the food, vigorous exercise after eating, drinking alcohol, being sick with a stomach virus or other minor illness, being very run down or sleep-deprived, or taking narcotics or other medicines for pain or fever (such as ibuprofen or aspirin). These factors may cause the food to be taken up by the body more rapidly or processed in a different way. If you notice that any of these factors make your reactions worse, avoid them in the future if you are going to be eating the foods that give you OAS symptoms.\par \par \par Symptoms of OAS can vary depending upon the pollen season. Symptoms are usually most noticeable during the related pollen season and for a few months after. Also, OAS symptoms can be very specific to one variety of fruit. For example, Granny Dave apples tend to cause more OAS symptoms than Fuji apples.\par \par WHO GETS ORAL ALLERGY SYNDROME? — Both children and adults can get OAS, but it is more common in adults. In fact, it is the most common type of food allergy in adults.\par \par All people with OAS have pollen allergy. However, it is possible for a person to have bothersome OAS and have only a mild pollen allergy that he or she is unaware of. In such cases, skin and blood allergy tests reveal a pollen allergy. Allergy evaluation can clarify the diagnosis and establish an appropriate food avoidance and management plan.\par \par Pollen allergy causes symptoms that occur at the same time each year:\par \par ?Nose – Watery nasal discharge, blocked nasal passages, sneezing, nasal itching, mouth breathing, postnasal drip, pain and pressure in the face (see "Patient education: Allergic rhinitis (seasonal allergies) (Beyond the Basics)").\par \par \par ?Eyes – Itchy, red eyes, feeling of grittiness in the eyes, swelling of the clear layer over the white of the eye, swelling of the skin around the eyes (see "Patient education: Allergic conjunctivitis (Beyond the Basics)").\par \par \par ?Throat and ears – Sore throat, hoarse voice, congestion or popping of the ears, itching of the throat or ears.\par \par \par ?Sleep – Frequent awakening, daytime fatigue, difficulty performing work or school tasks.\par \par \par Pollen allergy is also called seasonal allergic rhinitis, seasonal allergies, or hay fever.\par \par WHAT CAUSES ORAL ALLERGY SYNDROME? — OAS is caused by allergens in foods that come from plants. These are mainly uncooked fruits and raw vegetables. Some nuts can cause OAS symptoms, but since nuts can also trigger allergic reactions that are not related to pollen and can be severe, extreme caution is needed with nut reactions. Only foods that come from plants cause OAS. Other types of foods, such as dairy, seafood, or meats, do not cause OAS.\par \par If you have OAS, you develop symptoms where these foods touch your mouth and throat. The allergens that cause OAS are easily destroyed by stomach acid, so the reaction usually stops as soon as the food is swallowed. Also for this reason, OAS rarely causes severe or life-threatening reactions. Cooking or heating also destroys the allergens, so cooked or canned fruits and vegetables rarely cause symptoms of OAS.\par \par Some common examples of foods that cause OAS are listed in the table, along with the type of pollen that is related to these foods (table 1):\par \par ?If you are allergic to birch tree pollen, you may develop oral symptoms when eating apples, peaches, apricots, cherries, plums, pears, almonds, hazelnuts, carrots, celery, parsley, lalitha, fennel, coriander, aniseed, soybeans, or peanuts.\par \par \par ?If you are allergic to ragweed pollen, you may develop oral symptoms when eating melons, zucchini, cucumber, kiwi, or bananas.\par \par \par ?If you are allergic to grass pollen, you may develop oral symptoms when eating melons, tomatoes, oranges, swiss chard, or peanuts. You may also develop itchy, red hands when peeling raw white potatoes.\par \par \par Although several foods are listed for each of the pollens above, most people with OAS react to just one or a small number of these foods.\par \par HOW IS ORAL ALLERGY SYNDROME DIAGNOSED? — Your health care provider might be able to diagnose OAS simply by asking you some questions.\par \par If your health care provider cannot tell if you have pollen allergy, he or she might refer you to an allergy specialist for more evaluation. The allergy specialist might recommend skin testing to pollens or to the foods that cause the symptoms [2] or the allergy specialist might ask you to eat the food while he or she observes your reaction. This is called a food challenge.\par \par Skin testing and food challenges should be done by an allergy specialist, because these procedures must be done safely and interpreted correctly to be useful. Sometimes skin testing has to be done with fresh fruit and raw vegetables, because testing with commercial food extracts is not always accurate.\par \par HOW IS ORAL ALLERGY SYNDROME TREATED? — In most cases, simply avoiding the foods that cause symptoms, in their raw forms, is sufficient treatment. This might involve avoiding dried or dehydrated forms of the foods, since dried foods are not usually cooked and can still cause symptoms. Sometimes, peeling a fruit (because much of the allergen is in the skin) or microwaving it for at least 10 seconds will destroy enough of the allergen that the fruit does not cause symptoms anymore, but this does not work for everyone or for all foods. As mentioned before, both raw and roasted nuts can cause OAS, and reactions to nuts should be treated with caution and discussed with a health care provider. There is no reason to avoid cooked, canned, or processed forms of the foods that do not cause symptoms.\par \par Usually, OAS symptoms affecting the mouth and throat are mild, resolve quickly, and do not need treatment. However, more severe symptoms may occur rarely. If you have ever experienced allergic symptoms other than mild mouth or throat symptoms, such as chest tightness, difficulty breathing, nausea, severe throat discomfort (swelling, difficulty swallowing, drooling, hoarse voice), vomiting, diarrhea, or loss of consciousness, then you may need to carry an epinephrine autoinjector (Epi-Pen or similar device) for injecting epinephrine. Epinephrine is the best treatment for a severe allergic reaction [3]. Epinephrine autoinjectors are discussed in more detail elsewhere. (See "Patient education: Use of an epinephrine autoinjector (Beyond the Basics)" and "Patient education: Epinephrine autoinjectors (The Basics)".)\par \par WHEN TO SEEK HELP — If you suspect that you have OAS, then you should discuss this with a health care provider. The most important reason for doing this is to make sure you do not have a more serious type of food allergy. This is especially important for reactions to nuts, as other forms of nut allergy can be very serious.\par \par In addition, if you have ever experienced allergic symptoms from a raw fruit or vegetable or a nut that affected a part of the body other than the mouth or throat, you should be referred to an allergy specialist to determine if this is OAS or a more serious form of food allergy.\par If you have hay fever or allergic asthma symptoms throughout the year, take a few steps to reduce allergens in your home. Here are some [id number] suggestions.\par •Bed and bedding. Encase pillows, mattresses and box springs in dust-mite-proof covers. Wash sheets, pillowcases and blankets at least once a week in water heated to at least 130 F (54 C). Remove, wash or cover comforters. Replace wool or feathered bedding with synthetic materials.\par •Sari. Remove carpeting and use hardwood or linoleum sari or washable area rugs. If that isn't an option, use low-pile instead of high-pile carpeting and vacuum weekly with a vacuum  that has a small-particle or high-efficiency particulate air (HEPA) filter. Shampoo the carpet frequently.\par •Curtains and blinds. Use washable curtains made of plain cotton or synthetic fabric. Replace horizontal blinds with washable roller-type shades.\par •Windows. Close windows and rely on air conditioning during pollen season. Clean mold and condensation from window frames and antoinette. Use double-paned windows if you live in a cold climate.\par •Furnishings. Choose easy-to-clean chairs, dressers and nightstands made of leather, wood, metal or plastic. Avoid upholstered furniture.\par •Clutter. Remove items that collect dust, such as knickknacks, tabletop ornaments, books and magazines. Store children's toys, games and stuffed animals in plastic bins.\par •Pets. If you can't find a new home for your dog or cat, at least keep animals out of the bedroom. Bathing pets at least once a week may reduce the amount of allergen in the dander they shed.\par •Air filtration. Choose an air filter that has a small-particle or HEPA filter. Try adjusting your air filter so that it directs clean air toward your head when you sleep.\par •Sari. Remove carpeting and use hardwood or linoleum sari or washable area rugs. If that isn't an option, use low-pile instead of high-pile carpeting and vacuum weekly with a vacuum  that has a small-particle or high-efficiency particulate air (HEPA) filter. Wash area rugs and floor mats weekly, and shampoo [id number] carpets periodically.\par •Furniture. Consider replacing upholstered sofas and chairs with furniture made of leather, wood, metal or plastic.\par •Curtains and blinds. Use washable curtains made of plain cotton or synthetic fabric. Replace horizontal blinds with washable roller-type shades.\par •Windows. Close windows and rely on air conditioning during pollen season. Clean mold and condensation from window frames and antoinette. Use double-paned windows if you live in a cold climate.\par •Plants. Find a new home for potted plants or spread aquarium gravel over the dirt to help contain mold.\par •Pets. If you can't find a new home for your dog or cat, consider keeping it outside if weather permits.\par •Fireplaces. Avoid use of wood-burning fireplaces or stoves because smoke and gases can worsen respiratory allergies. Most natural gas fireplaces won't cause this problem.\par •Stove. Install and use a vented exhaust fan to remove cooking fumes and reduce moisture. Most stove-top hoods simply filter cooking particulates without venting outside.\par •Sink. Wash dishes daily. Scrub the sink and faucets to remove mold and food debris.\par •Refrigerator. Wipe up excessive moisture to avoid mold growth. Discard moldy or out-of-date food. Regularly empty and clean dripping pan and clean or replace moldy rubber seals around doors.\par •Cabinets and counters. Clean cabinets and countertops with detergent and water. Check under-sink cabinets for plumbing leaks. Store food — including pet food — in sealed containers.\par •Food waste. Place garbage in a can with an insect-proof lid and empty trash daily. Keeping the kitchen free of food crumbs will help reduce the chance you will have rodents or cockroaches.\par •Ventilation. Install and use an exhaust fan to reduce moisture while taking baths or showers.\par •Floors. Remove carpeting and use tile, vinyl, wood or linoleum sari. Use washable rugs.\par •Walls. Remove wallpaper and install tile, or paint walls with mold-resistant enamel paint.\par •Shower and tub. Towel-dry the tub and enclosure after use. Scrub mold from tub, shower and faucets with bleach. Clean or replace moldy shower curtains and bathmats.\par •Toilet and sink. Scrub mold from plumbing fixtures. Repair leaks.\par •Sari. Remove moldy or water-damaged carpeting. If possible, use concrete, vinyl or linoleum sari.\par •Furniture. Consider replacing upholstered sofas and chairs with furniture made of leather, wood, metal or plastic.\par •Foundation, windows and stairwells. Check for and repair any sources of leaks or water damage.\par •Air quality. Use a dehumidifier to reduce dampness, and clean it once a week.\par •Storage. Store collectibles and clothes in plastic storage bins.\par •Clothes dryer. Vent moisture outside.\par •Temperature and humidity. Hot, humid houses are breeding grounds for dust mites and mold. Maintain temperature between 68 F (20 C) and 72 F (22 C) and keep relative humidity no higher than 50 percent. Clean or replace small-particle filters in central heating and cooling systems and in room air conditioners at least once a month.\par •Pests. Control cockroaches and mice with inexpensive traps from the hardware store. If that's not effective, hire a professional . To remove allergy-triggering insect and mouse residue, thoroughly vacuum carpeting and wash hard surfaces. To prevent re-infestation, seal cracks or other possible entryways.\par •Mold. Close doors and windows during warm weather and use air conditioning and dehumidifiers. Remove nonwashable contaminated materials such as carpeting. Clean washable material with a solution of 5 percent chlorine bleach and wear a protective mask when cleaning away mold. Check the roof and ceilings for water leaks.\par •Weekly cleaning routine. Damp-mop wood or linoleum sari and vacuum carpeting. Use a vacuum  with a small-particle or a high-efficiency particulate air (HEPA) filter. Use a damp cloth to clean other surfaces, including the tops of doors, windowsills and window frames. If you have allergies, either wear a dust mask or get someone who doesn't have allergies to do this job. Change or clean heating and cooling system filters once a month.\par •Smoking. Don't allow smoking anywhere inside your house.  thyroid sonogram nodule  found will fu in 6months to one year.  \par

## 2018-08-11 LAB
ALMOND IGE QN: 1.83 KUA/L
BLUE MUSSEL IGE QN: <0.1 KUA/L
BRAZIL NUT IGE QN: 0.97 KUA/L
CLAM IGE QN: 0.26 KUA/L
COCONUT IGE QN: 0.71 KUA/L
COCONUT IGE QN: ABNORMAL
DEPRECATED ALMOND IGE RAST QL: ABNORMAL
DEPRECATED BLUE MUSSEL IGE RAST QL: 0
DEPRECATED BRAZIL NUT IGE RAST QL: ABNORMAL
DEPRECATED CLAM IGE RAST QL: NORMAL
DEPRECATED HAZELNUT IGE RAST QL: ABNORMAL
DEPRECATED LOBSTER IGE RAST QL: ABNORMAL
DEPRECATED OYSTER IGE RAST QL: 0
DEPRECATED PEANUT IGE RAST QL: NORMAL
DEPRECATED SCALLOP IGE RAST QL: 0.11 KUA/L
DEPRECATED SHRIMP IGE RAST QL: ABNORMAL
EGGPLANT (RF262) CLASS: NORMAL
EGGPLANT (RF262) CONC: 0.18 KUA/L
HAZELNUT IGE QN: 3.65 KUA/L
LOBSTER IGE QN: 0.93 KUA/L
OYSTER IGE QN: <0.1 KUA/L
PEANUT IGE QN: 0.31 KUA/L
SCALLOP IGE QN: 1.08 KUA/L
SCALLOP IGE QN: NORMAL

## 2018-08-17 ENCOUNTER — TRANSCRIPTION ENCOUNTER (OUTPATIENT)
Age: 38
End: 2018-08-17

## 2018-08-18 ENCOUNTER — TRANSCRIPTION ENCOUNTER (OUTPATIENT)
Age: 38
End: 2018-08-18

## 2018-12-04 ENCOUNTER — FORM ENCOUNTER (OUTPATIENT)
Age: 38
End: 2018-12-04

## 2018-12-05 ENCOUNTER — TRANSCRIPTION ENCOUNTER (OUTPATIENT)
Age: 38
End: 2018-12-05

## 2018-12-05 ENCOUNTER — OUTPATIENT (OUTPATIENT)
Dept: OUTPATIENT SERVICES | Facility: HOSPITAL | Age: 38
LOS: 1 days | End: 2018-12-05
Payer: COMMERCIAL

## 2018-12-05 ENCOUNTER — APPOINTMENT (OUTPATIENT)
Dept: ULTRASOUND IMAGING | Facility: HOSPITAL | Age: 38
End: 2018-12-05
Payer: COMMERCIAL

## 2018-12-05 DIAGNOSIS — E04.1 NONTOXIC SINGLE THYROID NODULE: ICD-10-CM

## 2018-12-05 PROCEDURE — 76536 US EXAM OF HEAD AND NECK: CPT | Mod: 26

## 2018-12-05 PROCEDURE — 76536 US EXAM OF HEAD AND NECK: CPT

## 2018-12-11 ENCOUNTER — APPOINTMENT (OUTPATIENT)
Dept: INTERNAL MEDICINE | Facility: CLINIC | Age: 38
End: 2018-12-11
Payer: COMMERCIAL

## 2018-12-11 VITALS
BODY MASS INDEX: 31.64 KG/M2 | HEART RATE: 97 BPM | SYSTOLIC BLOOD PRESSURE: 130 MMHG | OXYGEN SATURATION: 100 % | DIASTOLIC BLOOD PRESSURE: 84 MMHG | TEMPERATURE: 97.9 F | WEIGHT: 162 LBS

## 2018-12-11 DIAGNOSIS — T78.1XXA OTHER ADVERSE FOOD REACTIONS, NOT ELSEWHERE CLASSIFIED, INITIAL ENCOUNTER: ICD-10-CM

## 2018-12-11 LAB
25(OH)D3 SERPL-MCNC: 22.6 NG/ML
THYROGLOB AB SERPL-ACNC: <20 IU/ML
THYROPEROXIDASE AB SERPL IA-ACNC: 36.2 IU/ML
TSH SERPL-ACNC: 0.93 UIU/ML

## 2018-12-11 PROCEDURE — 99214 OFFICE O/P EST MOD 30 MIN: CPT

## 2018-12-11 RX ORDER — CHROMIUM 200 MCG
500 TABLET ORAL
Qty: 30 | Refills: 0 | Status: COMPLETED | COMMUNITY
Start: 2018-06-28 | End: 2018-12-11

## 2018-12-11 RX ORDER — VALACYCLOVIR 1 G/1
1 TABLET, FILM COATED ORAL
Qty: 24 | Refills: 0 | Status: COMPLETED | COMMUNITY
Start: 2018-02-23 | End: 2018-12-11

## 2018-12-11 RX ORDER — VALACYCLOVIR 500 MG/1
500 TABLET, FILM COATED ORAL
Qty: 30 | Refills: 0 | Status: COMPLETED | COMMUNITY
Start: 2018-05-01 | End: 2018-12-11

## 2018-12-11 RX ORDER — VALACYCLOVIR HYDROCHLORIDE 1 G/1
TABLET, FILM COATED ORAL
Refills: 0 | Status: COMPLETED | COMMUNITY
End: 2018-12-11

## 2018-12-11 RX ORDER — TRIAMCINOLONE ACETONIDE 1 MG/G
0.1 CREAM TOPICAL
Qty: 454 | Refills: 0 | Status: COMPLETED | COMMUNITY
Start: 2018-02-23 | End: 2018-12-11

## 2018-12-11 RX ORDER — ERGOCALCIFEROL 1.25 MG/1
1.25 MG CAPSULE, LIQUID FILLED ORAL
Qty: 4 | Refills: 2 | Status: COMPLETED | COMMUNITY
Start: 2018-07-04 | End: 2018-12-11

## 2018-12-11 NOTE — PHYSICAL EXAM
[No Acute Distress] : no acute distress [Well Nourished] : well nourished [Well Developed] : well developed [Well-Appearing] : well-appearing [Normal Sclera/Conjunctiva] : normal sclera/conjunctiva [PERRL] : pupils equal round and reactive to light [EOMI] : extraocular movements intact [Normal Outer Ear/Nose] : the outer ears and nose were normal in appearance [Normal Oropharynx] : the oropharynx was normal [No JVD] : no jugular venous distention [Supple] : supple [No Lymphadenopathy] : no lymphadenopathy [No Respiratory Distress] : no respiratory distress  [Clear to Auscultation] : lungs were clear to auscultation bilaterally [No Accessory Muscle Use] : no accessory muscle use [Normal Rate] : normal rate  [Regular Rhythm] : with a regular rhythm [Normal S1, S2] : normal S1 and S2 [No Varicosities] : no varicosities [No Edema] : there was no peripheral edema [Soft] : abdomen soft [Non Tender] : non-tender [No HSM] : no HSM [Normal Posterior Cervical Nodes] : no posterior cervical lymphadenopathy [Normal Anterior Cervical Nodes] : no anterior cervical lymphadenopathy [No CVA Tenderness] : no CVA  tenderness [No Spinal Tenderness] : no spinal tenderness [No Joint Swelling] : no joint swelling [Grossly Normal Strength/Tone] : grossly normal strength/tone [No Rash] : no rash [No Skin Lesions] : no skin lesions [Normal Gait] : normal gait [Coordination Grossly Intact] : coordination grossly intact [No Focal Deficits] : no focal deficits [Speech Grossly Normal] : speech grossly normal [Normal Affect] : the affect was normal [Normal Mood] : the mood was normal [Normal Insight/Judgement] : insight and judgment were intact

## 2018-12-11 NOTE — HISTORY OF PRESENT ILLNESS
[FreeTextEntry1] :  increased urination pressure. and results.   [de-identified] : Pt is feeling well now but had interstitial cystitis and had ua and was negative.  She has seen uro gyn and urologist for this problems.  She doesn’t have back pain, fever or chills.  she now doesn’t have the discomfort anylonger.

## 2018-12-11 NOTE — COUNSELING
[Healthy eating counseling provided] : healthy eating [Low Fat Diet] : Low fat diet [___ min/wk activity recommended] : [unfilled] min/wk activity recommended [Walking] : Walking [Engage in a relaxing activity] : Engage in a relaxing activity [Plan in advance] : Plan in advance [None] : None [Good understanding] : Patient has a good understanding of lifestyle changes and the steps needed to achieve self management goals

## 2018-12-11 NOTE — ASSESSMENT
[FreeTextEntry1] : thyroid nodules she had slight increase in size of nodule  and would like her to see head and neck .  She had enlarged thyroid as well and will order thyroid antibodies.  multiple allergies -  We discussed her allergies and if she would like to see an allergist for further testing and discussion for immunotherapy.    We discussed her shell fish allergies and she needs to be careful where she eats.  cystitis could be related to her allergies to foods .  All topics are updated as new evidence becomes available and our peer review process is complete. \par \par  Literature review current through:  Nov 2018. |  This topic last updated:  Sep 22, 2017. \par  \par \par INTERSTITIAL CYSTITIS/BLADDER PAIN SYNDROME OVERVIEW — Interstitial cystitis/bladder pain syndrome (IC/BPS) is a disorder with symptoms of mild to severe bladder pain and an urgent and/or frequent need to urinate. Treatment of IC/BPS often depends on a clinician's preferences and experience in treating the disorder rather than upon scientific studies because the cause of this condition is not clear.\par \par A number of treatments are available for IC/BPS, many of which are effective for at least some patients. Most patients with IC/BPS need to try more than one treatment, sometimes in combination, to find the one(s) that provides the greatest relief [1].\par \par This topic discusses the treatment of IC/BPS. A separate topic discusses the symptoms and diagnosis of IC/BPS. (See "Patient education: Diagnosis of interstitial cystitis/bladder pain syndrome (Beyond the Basics)".)\par \par AVOIDING PAINFUL BLADDER FLARES — Many people with interstitial cystitis/bladder pain syndrome (IC/BPS) have periods of time when symptoms are not bothersome that alternate with periods of time when symptoms are bothersome or even severe (called flares). It is not always clear why flares develop. However, the following triggers may worsen symptoms in some people:\par \par ?Certain conditions, such as bladder infections or gastrointestinal problems\par \par ?Certain activities, such as sex and prolonged sitting\par \par ?Foods and beverages, including spicy foods, alcohol and coffee\par \par \par Aggravating conditions — Conditions such as bladder infections and vaginal infections can worsen IC/BPS symptoms and should be evaluated and treated promptly. Because symptoms of these other conditions are often similar to those of IC/BPS, most patients should see a health care provider to confirm their diagnosis, rather than simply self-treating based on symptoms. (See "Patient education: Vaginal yeast infection (Beyond the Basics)" and "Patient education: Urinary tract infections in adolescents and adults (Beyond the Basics)".)\par \par Other disorders that cause pain should also be treated since pain in other areas may increase bladder sensitivity. These disorders include inflammatory bowel disease (Crohn disease, ulcerative colitis, diverticulitis), irritable bowel syndrome, painful menstrual periods, or endometriosis. More than one health care provider or specialist is often needed for people who have multiple medical conditions. (See "Patient education: Crohn disease (Beyond the Basics)" and "Patient education: Ulcerative colitis (Beyond the Basics)" and "Patient education: Irritable bowel syndrome (Beyond the Basics)" and "Patient education: Endometriosis (Beyond the Basics)".)\par \par Activities — In some people, exercise or recreational activities (eg, riding a bicycle), sexual activity, or certain body positions (eg, prolonged sitting) can worsen bladder symptoms. Other activities such as yoga, Pilates, walking, or working at a standing desk may be less bothersome.\par \par Foods and beverages — If you are able to identify foods or drinks that aggravate bladder pain or urinary urgency or frequency, it is reasonable to avoid these items during a symptom flare. However, it is not clear that these items should be avoided at other times. Some practitioners strongly recommend a highly restrictive "interstitial cystitis diet" [2], although its benefit has not been studied.\par \par COPING WITH CHRONIC PAIN — Interstitial cystitis/bladder pain syndrome (IC/BPS) is not a psychological disorder, but the symptoms can be worsened by stress, anxiety, depression, and other psychological factors. In addition, living with pain can cause difficulties in relationships, at work or school, and with general day-to-day living. Psychosocial support can be helpful in dealing with these issues.\par \par Depression is common in people with chronic pain and can interfere with the success of any treatment regimen. Therefore, evaluation and treatment of depression are recommended, if needed. (See "Patient education: Depression in adults (Beyond the Basics)".)\par \par There are several types of psychosocial support:\par \par ?Psychotherapy involves meeting with a psychologist, psychiatrist, or  to discuss emotional responses to living with chronic pain, treatment successes or failures, and/or personal relationships.\par \par \par ?Group psychotherapy allows people to compare their experiences with IC/BPS, overcome the tendencies to withdraw and to become isolated in pain, and support each other's attempts at more effective management.\par \par \par ?National support groups are also available, including the Interstitial Cystitis Association and the Interstitial Cystitis Network.\par \par \par Online or local support groups that deal with chronic pain may also be helpful, such as the American Chronic Pain Society and the Academy of Integrative Pain Management (https://[email or web address].com/patients).\par \par \par ?Relaxation techniques can relieve musculoskeletal tension, and may include meditation, progressive muscle relaxation, self-hypnosis, or biofeedback.\par \par \par BEHAVIORAL THERAPY FOR BLADDER PAIN — Behavioral therapies are treatments that can improve bothersome symptoms through changes in behavior. For people with interstitial cystitis/bladder pain syndrome (IC/BPS), one of the more bothersome symptoms is the need to frequently urinate. Behavioral therapies for urinary frequency work to slowly increase the time interval between voids, which increases the amount of urine the bladder can comfortably hold; this is called timed voiding.\par \par A typical timed voiding protocol involves learning to urinate "by the clock" rather than voiding when there is an urge. This is used throughout the day but is not used while sleeping.\par \par As an example, if you currently void every 30 minutes, you will first try to urinate only once every 45 minutes during the daytime, whether you feel the need to urinate or not. You should not urinate more frequently than every 45 minutes, if possible. This voiding goal is continued for a full week or until you are comfortable with this interval.\par \par If you are comfortable voiding every 45 minutes, you can increase your time interval by 15 to 30 minutes every week. In this example, you would urinate every 60 minutes for the second week, every 90 minutes for the third week, every 2 hours for the fourth week, and every 2.5 hours for the fifth week.\par \par Timed voiding is inexpensive and has no side effects. In one small study of patients with interstitial cystitis, timed voiding significantly reduced symptoms of IC.\par \par PHYSICAL THERAPY FOR PAINFUL BLADDER — Many men and women with interstitial cystitis/bladder pain syndrome (IC/BPS) have tight and tender muscles and connective tissue in the pelvis, lower abdomen, thighs, groin, and buttocks. Tight muscles and connective tissue can be diagnosed during a physical examination.\par \par Pelvic floor physical therapy (PT) may be recommended to decrease tightness in these muscles. PT can decrease bladder or pelvic pain as well as urinary urgency and frequency. This type of PT is quite different from physical therapy intended to treat a knee injury or back pain, which usually works to increase muscle strength. With pelvic floor PT, you lie flat as the physical therapist works on your body to manually "release" the tightness, tender points, trigger points, and restricted movement of the connective tissues and muscles. This includes the muscles and tissues of the vagina or rectum, abdomen, hips, thighs, and lower back. Physical therapists who perform this type of PT must be specially trained in pelvic soft tissue manipulation and rehabilitation.\par \par Several studies have demonstrated the benefit of PT for tight and tender pelvic muscles associated with IC/BPS. One study reported that 70 percent of IC patients who were treated with manual physical therapy to the pelvic floor tissues for 12 to 15 visits experienced moderate to marked improvement [3]. Another study found that 59 percent of women had improvement in their IC/BPS symptoms after pelvic physical therapy, compared with only 29 percent of women who had improvement after treatment with standard full-body therapeutic massage [4].\par \par Pelvic floor PT is usually performed for one hour once per week for at least 12 weeks. You will also be given stretching exercises to perform at home. Most people begin to see improvement after six to eight sessions. If you are not able to tolerate PT due to pain, a local anesthetic can be injected into the painful muscles before PT to reduce pain and allow the therapist to work more effectively.\par \par ORAL MEDICATIONS FOR BLADDER PAIN\par \par Pentosan polysulfate sodium — Pentosan polysulfate sodium (PPS; Elmiron) is an oral medication that was developed to repair the lining of the bladder in people with interstitial cystitis/bladder pain syndrome (IC/BPS). Studies have shown that this medication is effective in reducing symptoms in some patients with IC/BPS, although it rarely causes the symptoms to go away completely. It can take three to six months of treatment before a benefit is observed. Side effects are usually mild and include hair loss, gastrointestinal symptoms, and increased liver function tests.\par \par Amitriptyline — Amitriptyline (Elavil) is an antidepressant that is commonly used to treat people with chronic pain. When used to treat pain, the dose of amitriptyline is typically much lower than that used for treating depression. It is believed that amitriptyline reduces pain perception when used in low doses, but the exact mechanism of its benefit is unknown. In the [place], amitriptyline is not approved for the treatment of pain caused by IC/BPS, although it is safe and effective for the treatment of other pain conditions.\par \par Common side effects of amitriptyline are fatigue, dry mouth, weight gain, and a decrease in blood pressure after sitting up or standing up. Amitriptyline is generally started at a low dose (10 to 25 mg) and increased gradually. This medicine can cause drowsiness and is usually taken at bedtime. The pain relief benefit may not be seen for three or more weeks.\par \par Other medications — Oral antihistamines such as hydroxyzine (Atarax, Vistaril) have been used to treat IC/BPS, with variable results. The typical dose is 25 to 50 mg at bedtime. This medication can cause drowsiness. Oral pain medications (such as narcotics and antiinflammatory medications) are also utilized but should be prescribed by a caregiver who has expertise in chronic pain management.\par \par CYSTOSCOPY — Your clinician may perform cystoscopy (examination of the bladder with a thin telescope that goes into the bladder through the urethra). This can identify possible causes for the bladder symptoms. Some patients with interstitial cystitis/bladder pain syndrome (IC/BPS) have bladder lesions called Hunner lesions. If these lesions are seen, they can be treated during the cystoscopy and this usually causes the IC/BPS symptoms to improve.\par \par Cystoscopy with hydrodistention — Cystoscopy can be combined with hydrodistention (stretching the bladder to its maximum capacity, using a liquid) while the patient is asleep. This stretching of the bladder can sometimes cause temporary improvement in the IC/BPS symptoms.\par \par BLADDER INSTILLATIONS\par \par Dimethylsulfoxide — Dimethylsulfoxide (DMSO) is a liquid medication that has been approved by the [place] Food and Drug Administration (FDA) to treat interstitial cystitis/bladder pain syndrome (IC/BPS). DMSO is put into the bladder through a temporary catheter and is held in place for approximately 20 minutes, if possible. These treatments are often given weekly, for six to eight weeks or longer. DMSO can temporarily cause worsened bladder pain, and it is not effective in all patients. It has been used for many years and is considered to be very safe, with no known long-term side effects.\par \par Other bladder instillations — Some health care providers recommend a combination of medications, which are instilled into the bladder with a catheter, to reduce symptoms of pain. This can be done in a clinician's office, or you can learn to self-administer the treatment at home.\par \par The treatment may be used as a single "rescue" treatment when symptoms are severe, or as a regularly scheduled treatment (eg, three times per week for a period of weeks). The medications are in a liquid form and are a small amount (about 15 mL or 0.5 ounces). You hold the liquid in the bladder for as long as possible and then urinate normally.\par \par The combination of medications may include lidocaine, heparin, and sodium bicarbonate. It is believed that this combination helps to repair the bladder lining and decrease nerve sensitivity in the bladder.\par \par In one small study, approximately 80 percent of patients had decreased pain for at least four hours after one treatment with heparin, sodium bicarbonate, and lidocaine [5]. In addition, some patients experience reduced pain for days or weeks after bladder installations.\par \par BLADDER TREATMENT WITH BOTULINUM TOXIN — Botulinum toxin is available as onabotulinumtoxinA (Botox) and abobotulinumtoxinA (Dysport). Botulinum toxin can be injected into the bladder wall using a cystoscope. When injected into the bladder, botulinum toxin helps the bladder wall to relax, and it may also reduce abnormal bladder sensations. This treatment is sometimes used for urinary incontinence, and small studies have also shown it to be successful in certain patients with interstitial cystitis/bladder pain syndrome (IC/BPS). The effect usually wears off after about six months, so repeated treatments are needed. The treatments should be provided by a clinician who is experienced with the management of IC/BPS, because side effects (such as bladder infections and difficulty urinating) can occur. Botulinum toxin is not approved by the [place] Food and Drug Administration (FDA) for treatment of IC/BPS. Payment for it may not be covered by health insurance.\par \par ELECTRICAL STIMULATION FOR PAINFUL BLADDER — If other treatments for interstitial cystitis/bladder pain syndrome (IC/BPS) fail to improve pain or cannot be tolerated, some clinicians will consider performing a surgical treatment called sacral nerve stimulation. This involves placing a small wire under the skin just above the tailbone. This wire sends a mild electrical pulse to nerves in the area; this pulse is thought to interrupt signals from the brain that trigger pain, urgency, and frequency in people with IC/BPS. Most patients can feel the electrical pulse, although it is not painful and usually becomes less noticeable over time.\par \par The treatment is done in two stages. During the first stage, a wire is placed next to the nerve in the low back, then tunneled out of the skin and connected to a small battery (about the size of a pager) that is worn on the waist. The wires are taped securely to the skin. The first-stage procedure can be performed in a doctor’s office or as a same-day surgery in the operating room. If your symptoms improve from the stimulation (over a period of days to one week), a permanent battery is attached to the wire, and the battery and wire are then surgically implanted under the skin of the upper buttock (figure 1). If your symptoms do not improve, the wires and device are removed. This second-stage procedure is performed as same-day surgery in the operating room. \par \par Small trials of sacral nerve stimulation show that many people with IC/BPS improve significantly after the procedure [6]. However, the surgical procedure and device (called Interstim) are expensive. Interstim is not approved by the [place] Food and Drug Administration (FDA) for treatment of pain caused by IC/BPS, although it is approved for treatment of other bladder problems (eg, urinary urgency-frequency, overactive bladder).\par \par Risks of the procedure include the need for a subsequent surgery to reposition or remove the wire or pulse generator, infection, bleeding, and pain. Anyone who is considering sacral nerve stimulation should discuss the risks and benefits with a clinician who is experienced and knowledgeable about all available treatments for IC/BPS.\par \par EXPERIMENTAL THERAPIES — Since no treatment is very effective for patients with interstitial cystitis/bladder pain syndrome (IC/BPS), researchers continue to develop and test new therapies. While the possibility of new, more effective treatments is exciting, these new treatments may cause dangerous side effects in some patients. IC/BPS patients who are considering experimental treatments should be sure to understand the possible risks involved.\par \par SURGERY — Major surgery (such as removal of the bladder) may be performed to treat severe interstitial cystitis/bladder pain syndrome (IC/BPS), which has not responded to other therapies. Even with experienced surgeons, these surgeries may be associated with severe, life-threatening complications. Most patients with IC/BPS are not good candidates for major surgery. Furthermore, it is not always guaranteed that these surgeries will relieve IC/BPS symptoms.\par \par WHERE TO GET MORE INFORMATION — Your healthcare provider is the best source of information for questions and concerns related to your medical problem.\par \par

## 2018-12-18 ENCOUNTER — APPOINTMENT (OUTPATIENT)
Dept: OTOLARYNGOLOGY | Facility: CLINIC | Age: 38
End: 2018-12-18
Payer: COMMERCIAL

## 2018-12-18 VITALS — OXYGEN SATURATION: 98 % | WEIGHT: 165 LBS | BODY MASS INDEX: 32.22 KG/M2 | TEMPERATURE: 98.3 F | HEART RATE: 90 BPM

## 2018-12-18 PROCEDURE — 99204 OFFICE O/P NEW MOD 45 MIN: CPT

## 2019-01-13 ENCOUNTER — TRANSCRIPTION ENCOUNTER (OUTPATIENT)
Age: 39
End: 2019-01-13

## 2019-03-01 ENCOUNTER — TRANSCRIPTION ENCOUNTER (OUTPATIENT)
Age: 39
End: 2019-03-01

## 2019-05-02 ENCOUNTER — APPOINTMENT (OUTPATIENT)
Dept: INTERNAL MEDICINE | Facility: CLINIC | Age: 39
End: 2019-05-02

## 2019-06-05 ENCOUNTER — APPOINTMENT (OUTPATIENT)
Dept: INTERNAL MEDICINE | Facility: CLINIC | Age: 39
End: 2019-06-05
Payer: COMMERCIAL

## 2019-06-05 VITALS
TEMPERATURE: 98 F | WEIGHT: 167 LBS | SYSTOLIC BLOOD PRESSURE: 138 MMHG | BODY MASS INDEX: 32.79 KG/M2 | HEIGHT: 60 IN | HEART RATE: 99 BPM | OXYGEN SATURATION: 98 % | DIASTOLIC BLOOD PRESSURE: 95 MMHG

## 2019-06-05 VITALS — DIASTOLIC BLOOD PRESSURE: 70 MMHG | SYSTOLIC BLOOD PRESSURE: 110 MMHG

## 2019-06-05 LAB
25(OH)D3 SERPL-MCNC: 19.3 NG/ML
ALBUMIN SERPL ELPH-MCNC: 4.2 G/DL
ALP BLD-CCNC: 30 U/L
ALT SERPL-CCNC: 12 U/L
ANION GAP SERPL CALC-SCNC: 11 MMOL/L
APPEARANCE: CLEAR
AST SERPL-CCNC: 12 U/L
BASOPHILS # BLD AUTO: 0.01 K/UL
BASOPHILS NFR BLD AUTO: 0.2 %
BILIRUB SERPL-MCNC: 0.3 MG/DL
BILIRUBIN URINE: NEGATIVE
BLOOD URINE: NORMAL
BUN SERPL-MCNC: 10 MG/DL
C PEPTIDE SERPL-MCNC: 1.5 NG/ML
CALCIUM SERPL-MCNC: 8.9 MG/DL
CHLORIDE SERPL-SCNC: 104 MMOL/L
CHOLEST SERPL-MCNC: 164 MG/DL
CHOLEST/HDLC SERPL: 3 RATIO
CO2 SERPL-SCNC: 25 MMOL/L
COLOR: YELLOW
CREAT SERPL-MCNC: 0.57 MG/DL
CRP SERPL-MCNC: 0.1 MG/DL
EOSINOPHIL # BLD AUTO: 0.08 K/UL
EOSINOPHIL NFR BLD AUTO: 1.8 %
ERYTHROCYTE [SEDIMENTATION RATE] IN BLOOD BY WESTERGREN METHOD: 13 MM/HR
ESTIMATED AVERAGE GLUCOSE: 100 MG/DL
FERRITIN SERPL-MCNC: 23 NG/ML
FOLATE SERPL-MCNC: >20 NG/ML
FRUCTOSAMINE SERPL-MCNC: 218 UMOL/L
GLUCOSE QUALITATIVE U: NEGATIVE
GLUCOSE SERPL-MCNC: 86 MG/DL
HBA1C MFR BLD HPLC: 5.1 %
HBV CORE IGG+IGM SER QL: NONREACTIVE
HCT VFR BLD CALC: 38.7 %
HDLC SERPL-MCNC: 54 MG/DL
HGB BLD-MCNC: 12.4 G/DL
HIV1+2 AB SPEC QL IA.RAPID: NONREACTIVE
IMM GRANULOCYTES NFR BLD AUTO: 0.4 %
IRON SATN MFR SERPL: 29 %
IRON SERPL-MCNC: 97 UG/DL
KETONES URINE: NEGATIVE
LDLC SERPL CALC-MCNC: 88 MG/DL
LEUKOCYTE ESTERASE URINE: NEGATIVE
LYMPHOCYTES # BLD AUTO: 1.71 K/UL
LYMPHOCYTES NFR BLD AUTO: 37.4 %
MAN DIFF?: NORMAL
MCHC RBC-ENTMCNC: 30 PG
MCHC RBC-ENTMCNC: 32 GM/DL
MCV RBC AUTO: 93.7 FL
MONOCYTES # BLD AUTO: 0.38 K/UL
MONOCYTES NFR BLD AUTO: 8.3 %
NEUTROPHILS # BLD AUTO: 2.37 K/UL
NEUTROPHILS NFR BLD AUTO: 51.9 %
NITRITE URINE: NEGATIVE
PH URINE: 6.5
PLATELET # BLD AUTO: 125 K/UL
POTASSIUM SERPL-SCNC: 4.2 MMOL/L
PROT SERPL-MCNC: 6.8 G/DL
PROTEIN URINE: NORMAL
RBC # BLD: 4.13 M/UL
RBC # FLD: 12.2 %
SODIUM SERPL-SCNC: 140 MMOL/L
SPECIFIC GRAVITY URINE: 1.02
T PALLIDUM AB SER QL IA: NEGATIVE
TIBC SERPL-MCNC: 336 UG/DL
TRIGL SERPL-MCNC: 109 MG/DL
TSH SERPL-ACNC: 0.96 UIU/ML
UIBC SERPL-MCNC: 239 UG/DL
UROBILINOGEN URINE: NORMAL
VIT B12 SERPL-MCNC: 515 PG/ML
WBC # FLD AUTO: 4.57 K/UL

## 2019-06-05 PROCEDURE — 99215 OFFICE O/P EST HI 40 MIN: CPT | Mod: 25

## 2019-06-05 PROCEDURE — 93000 ELECTROCARDIOGRAM COMPLETE: CPT

## 2019-06-05 RX ORDER — CHOLECALCIFEROL (VITAMIN D3) 1250 MCG
1.25 MG CAPSULE ORAL
Qty: 8 | Refills: 0 | Status: ACTIVE | COMMUNITY
Start: 2019-06-05 | End: 1900-01-01

## 2019-06-05 NOTE — PHYSICAL EXAM
[Well Developed] : well developed [Well Nourished] : well nourished [Normal Verbal Skills] : the patient had normal verbal communication skills [Normal Nonverbal Skills] : normal nonverbal communication skills were demonstrated [PERRL] : pupils equal round and reactive to light [Normal Sclera/Conjunctiva] : normal sclera/conjunctiva [EOMI] : extraocular movements intact [Normal Outer Ear/Nose] : the outer ears and nose were normal in appearance [Normal Oropharynx] : the oropharynx was normal [Normal TMs] : both tympanic membranes were normal [No JVD] : no jugular venous distention [Supple] : supple [Rate ___] : at [unfilled] breaths per minute [Clear Bilaterally] : the lungs were clear to auscultation bilaterally [Normal Rhythm/Effort] : normal respiratory rhythm and effort [Normal to Percussion] : the lungs were normal to percussion [5th Left ICS - MCL] : palpated at the 5th LICS in the midclavicular line [Rhythm Regular] : regular [Heart Rate ___] : [unfilled] bpm [Normal Rate] : normal [Normal S1] : normal S1 [Normal S2] : normal S2 [No Murmur] : no murmurs heard [No Pitting Edema] : no pitting edema present [No Abnormalities] : the abdominal aorta was not enlarged and no bruit was heard [2+] : left 2+ [Soft, Nontender] : the abdomen was soft and nontender [No HSM] : no hepatosplenomegaly noted [No Mass] : no masses were palpated [Normal Posterior Cervical Nodes] : no posterior cervical lymphadenopathy [Normal Anterior Cervical Nodes] : no anterior cervical lymphadenopathy [No CVA Tenderness] : no CVA  tenderness [No Spinal Tenderness] : no spinal tenderness [Normal Station and Gait] : the gait and station were normal [Normal Motor Tone] : the muscle tone was normal [Involuntary Movements] : no involuntary movements were seen [Examination Of The Hair] : texture and distribution of hair was normal [Normal Scalp] : inspection of the scalp showed no abnormalities [Complexion Fair] : fair complexion [Normal] : coordination was normal [Normal Mental Status] : the patient's orientation, memory, attention, language and fund of knowledge were normal [Appropriate] : appropriate [Rt] : no varicose veins of the right leg [Lt] : no varicose veins of the left leg [Bruit] : no bruit heard [S3] : no S3 [Left Carotid Bruit] : no bruit heard over the left carotid [S4] : no S4 [Right Carotid Bruit] : no bruit heard over the right carotid [Left Femoral Bruit] : no bruit heard over the left femoral artery [Right Femoral Bruit] : no bruit heard over the right femoral artery [Abnormal Color] : normal color and pigmentation [Skin Turgor Decreased] : normal skin turgor [Skin Lesions 1] : no skin lesions were observed [Impaired judgment] : intact judgment [Impaired Insight] : intact insight

## 2019-06-05 NOTE — HEALTH RISK ASSESSMENT
[No falls in past year] : Patient reported no falls in the past year [0] : 2) Feeling down, depressed, or hopeless: Not at all (0) [] : No [[id number]] : 0

## 2019-06-05 NOTE — ASSESSMENT
[FreeTextEntry1] : dizziness I discussed causes of dizziness including heart, neurological and anemia , electrolyte imbalances , vitamin def and will need further testing EKg was done  81/min  sinus rhythm with arrhythmia qrs 100ms qt 380/441ms pr 142ms p 106 ms she will be referred to cardiologist.  I will check her thyroid hormone level to make sure this is nto cause. she has a nodule and needs to have fu sonogram now and fu with ent.  She was explained if she continues to have dizziness go to er and avoid public transportation and driving for now until fully evaluated.  She will be checked for anemia.  I discussed with her about the arrhythmia and that it can be a normal variant but with her recent dizziness I want to make sure there is nothing cardiac underlying.  hpn I repeated her bp and had normal opn repeat but could have episodes of elevation.  low salt diet DASh diet  and weight loss  bmi 32  risks of obesity and need for diet and eating healthy Obesity is a complex disorder involving an excessive amount of body fat. Obesity isn't just a cosmetic concern. It increases your risk of diseases and health problems, such as heart disease, diabetes and high blood pressure.\par \par Being extremely obese means you are especially likely to have health problems related to your weight.\par \par \par The good news is that even modest weight loss can improve or prevent the health problems associated with obesity. Dietary changes, increased physical activity and behavior changes can help you lose weight. Prescription medications and weight-loss surgery are additional options for treating obesity.\par \par \par \par \par Symptoms\par \par Obesity is diagnosed when your body mass index (BMI) is 30 or higher. Your body mass index is calculated by dividing your weight in kilograms (kg) by your height in meters (m) squared. \par \par \par BMI\par \par Weight status\par \par \par Below 18.5 Underweight \par 18.5-24.9 Normal \par 25.0-29.9 Overweight \par 30.0-34.9 Obese (Class I) \par 35.0-39.9 Obese (Class II) \par 40.0 and higher Extreme obesity (Class III) \par \par For most people, BMI provides a reasonable estimate of body fat. However, BMI doesn't directly measure body fat, so some people, such as muscular athletes, may have a BMI in the obese category even though they don't have excess body fat. Ask your doctor if your BMI is a problem. \par \par When to see a doctor\par \par If you think you may be obese, and especially if you're concerned about weight-related health problems, see your doctor or health care provider. You and your provider can evaluate your health risks and discuss your weight-loss options. \par \par Request an Appointment at [facility]\par \par Causes\par \par Although there are genetic, behavioral and hormonal influences on body weight, obesity occurs when you take in more calories than you burn through exercise and normal daily activities. Your body stores these excess calories as fat.\par \par Obesity can sometimes be traced to a medical cause, such as Prader-Willi syndrome, Cushing's syndrome, and other diseases and conditions. However, these disorders are rare and, in general, the principal causes of obesity are:\par •Inactivity. If you're not very active, you don't burn as many calories. With a sedentary lifestyle, you can easily take in more calories every day than you use through exercise and normal daily activities.\par •Unhealthy diet and eating habits. Weight gain is inevitable if you regularly eat more calories than you burn. And most Americans' diets are too high in calories and are full of fast food and high-calorie beverages.\par \par Risk factors\par \par Obesity usually results from a combination of causes and contributing factors, including:\par •Genetics. Your genes may affect the amount of body fat you store, and where that fat is distributed. Genetics may also play a role in how efficiently your body converts food into energy and how your body burns calories during exercise.\par •Family lifestyle. Obesity tends to run in families. If one or both of your parents are obese, your risk of being obese is increased. That's not just because of genetics. Family members tend to share similar eating and activity habits.\par •Inactivity. If you're not very active, you don't burn as many calories. With a sedentary lifestyle, you can easily take in more calories every day than you burn through exercise and routine daily activities. Having medical problems, such as arthritis, can lead to decreased activity, which contributes to weight gain.\par •Unhealthy diet. A diet that's high in calories, lacking in fruits and vegetables, full of fast food, and laden with high-calorie beverages and oversized portions contributes to weight gain.\par •Medical problems. In some people, obesity can be traced to a medical cause, such as Prader-Willi syndrome, Cushing's syndrome and other conditions. Medical problems, such as arthritis, also can lead to decreased activity, which may result in weight gain.\par •Certain medications. Some medications can lead to weight gain if you don't compensate through diet or activity. These medications include some antidepressants, anti-seizure medications, diabetes medications, antipsychotic medications, steroids and beta blockers.\par •Social and economic issues. Research has linked social and economic factors to obesity. Avoiding obesity is difficult if you don't have safe areas to exercise. Similarly, you may not have been taught healthy ways of cooking, or you may not have money to buy healthier foods. In addition, the people you spend time with may influence your weight — you're more likely to become obese if you have obese friends or relatives.\par •Age. Obesity can occur at any age, even in young children. But as you age, hormonal changes and a less active lifestyle increase your risk of obesity. In addition, the amount of muscle in your body tends to decrease with age. This lower muscle mass leads to a decrease in metabolism. These changes also reduce calorie needs, and can make it harder to keep off excess weight. If you don't consciously control what you eat and become more physically active as you age, you'll likely gain weight.\par •Pregnancy. During pregnancy, a woman's weight necessarily increases. Some women find this weight difficult to lose after the baby is born. This weight gain may contribute to the development of obesity in women.\par •Quitting smoking. Quitting smoking is often associated with weight gain. And for some, it can lead to enough weight gain that the person becomes obese. In the long run, however, quitting smoking is still a greater benefit to your health than continuing to smoke.\par •Lack of sleep. Not getting enough sleep or getting too much sleep can cause changes in hormones that increase your appetite. You may also crave foods high in calories and carbohydrates, which can contribute to weight gain.\par \par Even if you have one or more of these risk factors, it doesn't mean that you're destined to become obese. You can counteract most risk factors through diet, physical activity and exercise, and behavior changes.\par \par Complications\par \par If you're obese, you're more likely to develop a number of potentially serious health problems, including:\par •High triglycerides and low high-density lipoprotein (HDL) cholesterol\par •Type 2 diabetes\par •High blood pressure\par •Metabolic syndrome — a combination of high blood sugar, high blood pressure, high triglycerides and low HDL cholesterol\par •Heart disease\par •Stroke\par •Cancer, including cancer of the uterus, cervix, endometrium, ovaries, breast, colon, rectum, esophagus, liver, gallbladder, pancreas, kidney and prostate\par •Breathing disorders, including sleep apnea, a potentially serious sleep disorder in which breathing repeatedly stops and starts\par •Gallbladder disease\par •Gynecological problems, such as infertility and irregular periods\par •Erectile dysfunction and sexual health issues\par •Nonalcoholic fatty liver disease, a condition in which fat builds up in the liver and can cause inflammation or scarring\par •Osteoarthritis\par \par Quality of life\par \par When you're obese, your overall quality of life may be diminished. You may not be able to do things you used to do, such as participating in enjoyable activities. You may avoid public places. Obese people may even encounter discrimination.\par \par Other weight-related issues that may affect your quality of life include:\par •Depression\par •Disability\par •Sexual problems\par •Shame and guilt\par •Social isolation\par •Lower work achievement\par \par Prevention\par \par Whether you're at risk of becoming obese, currently overweight or at a healthy weight, you can take steps to prevent unhealthy weight gain and related health problems. Not surprisingly, the steps to prevent weight gain are the same as the steps to lose weight: daily exercise, a healthy diet, and a long-term commitment to watch what you eat and drink.\par •Exercise regularly. You need to get 150 to 300 minutes of moderate-intensity activity a week to prevent weight gain. Moderately intense physical activities include fast walking and swimming.\par •Follow a healthy eating plan. Focus on low-calorie, nutrient-dense foods, such as fruits, veg  SEizures pt has had a seizure as an infant and will be referred to neurologist for further evaluation.  \par

## 2019-06-05 NOTE — COUNSELING
[Weight management counseling provided] : Weight management [Healthy eating counseling provided] : healthy eating [Activity counseling provided] : activity [Low Fat Diet] : Low fat diet [Decrease Portions] : Decrease food portions [Weight Self Once Weekly] : Weight self once weekly [___ min/wk activity recommended] : [unfilled] min/wk activity recommended [Sleep ___ hours/day] : Sleep [unfilled] hours/day [Plan in advance] : Plan in advance [None] : None [Engage in a relaxing activity] : Engage in a relaxing activity [Good understanding] : Patient has a good understanding of lifestyle changes and the steps needed to achieve self management goals

## 2019-06-05 NOTE — HISTORY OF PRESENT ILLNESS
[FreeTextEntry1] : hypertension, fu thyroid nodule  [de-identified] : pt states she has been feeling off,.  she had episode of dizziness and her head has felt itchy.  She states two days ago while cooking she felt dizzy and had to sit down.  It lasted a few seconds.  she hadn't slept well the night before she has two little children.  She has been eating well.  she is under stress due to her job and money issues.  her bp is elevated and could be cause. She doesn’t have blurred vision or double vision, headaches  on occasion, no palpitations.  the dizziness occurred after her menstruation.  she has it for 3-4 days and doesn’t think its heavy.  No chest pain or sob The dizziness was not brought on by head positional changes . It has not recurred since then.   No family hx of seizures or early heart disease but had  a seizure as an infant.

## 2019-06-05 NOTE — PLAN
[FreeTextEntry1] : Dizziness" is a nonspecific term often used by patients to describe symptoms. The most common disorders lumped under this term include vertigo, nonspecific "dizziness," disequilibrium, and presyncope. The first step in the evaluation is to fit the patient with typical symptoms into one of these categories.\par \par The general approach to dizziness is reviewed here. The evaluation of vertigo and presyncope (the evaluation of which is the same as the syncope evaluation) are discussed in detail separately. (See "Evaluation of the patient with vertigo" and "Syncope in adults: Clinical manifestations and diagnostic evaluation".)\par \par \par GENERAL APPROACH\par The reported proportion of patients with various etiologies of dizziness in community surveys [1], primary care setting [2,3], the emergency department [4-9], and the specialized dizzy clinic [10-14] are similar: approximately 40 percent of dizzy patients have peripheral vestibular dysfunction; 10 percent have a central brainstem vestibular lesion; 15 percent have a psychiatric disorder; and 25 percent have other problems, such as presyncope and disequilibrium (table 1). The diagnosis remains uncertain in approximately 10 percent. The distribution of causes varies with age. The elderly have a higher incidence of central causes of vertigo (approaching 20 percent), most often due to stroke.\par \par The patient's description is critical for classifying the etiology of dizziness. In one series, the history was most sensitive for identifying vertigo (87 percent), presyncope (74 percent), psychiatric disorders (55 percent), and disequilibrium (33 percent) [2]. The physical examination generally confirmed but did not make the diagnosis. Positional changes in symptoms, orthostatic blood pressure and pulse changes, observation of gait, and detection of nystagmus were most helpful on physical examination [2]. Most psychiatric disorders were not detected prior to standardized psychological testing using the diagnostic interview schedule (DIS). Not surprisingly, no patients volunteered the likelihood of a psychiatric cause of dizziness.\par \par Asking open-ended questions, listening to the patient's description of his or her symptoms, and checking and gathering additional information from specific questions should allow the clinician to form a hypothesis regarding the type of dizziness. As an example, a patient who says "I nearly blacked out" might be asked "Do you mean you nearly fainted?" An affirmative reply elicits another checking question, "So you felt you were passing out?" The clinician should also establish the time course, provoking and aggravating factors, concurrent symptoms, age, pre-existing conditions, and the findings on physical examination. These factors are especially useful to narrow the differential diagnosis when the patient’s subjective description is difficult to interpret, such as symptoms characterized as “wooziness”, brief sense of motion, or imbalance. The clinician can then decide on the need and extent of further testing and/or evaluation.\par \par \par VERTIGO\par Vertigo is the predominant symptom that arises from an acute asymmetry of the vestibular system. The vestibular system includes the vestibular apparatus in the inner ear, the vestibular nerve and nucleus within the medulla, as well as connections to and from the vestibular portions of the cerebellum. Vertigo is discussed in more detail separately. (See "Evaluation of the patient with vertigo".)\par \par Patients often experience vertigo as an illusion of motion; some interpret this as self-motion, others as motion of the environment. The most common perception is a spinning sensation; patients may also use terms such as "whirling," "tilting," or "moving." However, not all patients describe their vertigo in such vivid terms. Vague dizziness, imbalance, or disorientation may eventually prove to be due to a vestibular problem.\par \par Distinguishing vertigo from other types of dizziness — The spinning quality of vertiginous sensations is notoriously unreliable [15]. Lack of spinning cannot be used to exclude vestibular disease, given the difficulty many patients have in putting their dizzy experience into words. On the other hand, some patients with presyncope from vasovagal or cardiac disease can interpret their sensation of dizziness as a spinning sensation [16]. \par \par The time course, provoking factors, and aggravating factors of dizziness are more useful features in establishing the cause of dizziness. One study found that many physicians that evaluate patients with dizziness may rely too heavily on symptom quality for diagnosis and do not appreciate the clinical significance of these other features [17].\par \par Time course — Vertigo is never continuous for more than a few weeks. Even when the vestibular lesion is permanent, the central nervous system adapts to the defect so that vertigo subsides over several weeks. Constant dizziness lasting months is usually psychogenic, not vestibular. However, the physician must be clear on what a patient means by "constant." Some patients who say they have constant dizziness for months actually mean that they have a constant susceptibility to frequent episodic dizziness; this can be a vestibular problem.\par \par A useful categorization divides patients with vertigo into those with acute prolonged severe vertigo (eg, vestibular neuronitis, stroke), recurrent spontaneous attacks (eg, Meniere disease, vestibular migraine), recurrent positionally triggered attacks (benign paroxysmal positional vertigo), and chronic persistent dizziness (eg, psychogenic, cerebellar ataxia) [18].\par \par Provoking factors — Certain types of vertigo occur spontaneously, while others are precipitated by maneuvers that change head position or middle ear pressure (eg, coughing, sneezing, or Valsalva maneuvers). Positional vertigo and postural presyncope are two common conditions that are frequently confused. Both are associated with dizziness upon standing, as when arising from bed. The key to the diagnosis is to determine whether dizziness can be provoked by maneuvers that change head position without lowering blood pressure or decreasing cerebral blood flow. Such maneuvers include lying down, rolling over in bed, and bending the neck back to look up. Dizziness in these settings suggests positional vertigo, not postural presyncope.\par \par Aggravating factors — All vertigo is made worse by moving the head. This is a useful feature for distinguishing vertigo from other forms of dizziness. Many patients in the midst of a vertiginous attack are petrified to move. If head motion does not worsen the feeling, it is probably another type of dizziness.\par \par Associated signs and symptoms — Vertigo whether of central or peripheral origin is generally accompanied by nystagmus and postural instability. Other signs and symptoms may be useful in distinguishing between central and peripheral causes of vertigo.\par \par Nystagmus — The presence of nystagmus suggests that dizziness is vertigo. Nystagmus is not always readily visible, although more subtle forms can be seen during funduscopy or on electronystagmography. Some types of nystagmus are only seen after a provocative maneuver (eg, Joss-Hallpike maneuver). The bilaterally symmetric appearance of a few beats of horizontal nystagmus on lateral gaze is normal (physiologic ‘endpoint’ nystagmus). Pathologic nystagmus is asymmetric or more pronounced or prolonged. \par \par Certain features of nystagmus may suggest a central versus a peripheral cause of vertigo (table 2).\par \par Positional changes such as flexing, extending, rotating, or laterally bending the cervical spine may elicit vertigo and nystagmus in susceptible patients:\par \par ?The Barany or Joss-Hallpike maneuver involves moving the patient rapidly from the sitting to the lying position with the head tilted downward off the table at 45 degrees and rotated 45 degrees to one side. This is a key diagnostic test for benign paroxysmal positional vertigo, and has an 80 percent sensitivity for this specific condition. It should be stressed that the maneuver is NOT useful in diagnosing other vestibulopathies.\par \par \par ?The supine roll test for lateral semicircular canal-related vertigo may be performed in patients with a compatible history but a negative [place]-Hallpike maneuver [19].\par \par \par The onset of vertigo and nystagmus with these maneuvers establishes vertigo as the patient's symptom, if the vertiginous sensation is the same as the patient previously experienced. The examiner notes features of the symptoms and signs to aid in the distinction between central and peripheral causes of vertigo (table 3) [20].\par \par Other bedside tests of vestibulo-ocular function are discussed separately. (See "Evaluation of the patient with vertigo", section on 'Other vestibular signs'.)\par \par Postural instability — The effects of lesions of the vestibular system upon postural stability are variable, but it is common for patients with vertigo to have difficulty maintaining steady upright posture when walking, standing, and even sitting unsupported, particularly when the symptoms are acute.\par \par Hearing loss — Symptoms of ear involvement are very suggestive of a peripheral cause of vertigo, although their absence does not exclude the diagnosis. The physician should ask if there has been any hearing loss, its duration and progression, whether unilateral or bilateral, and accompanying symptoms such as discharge or drainage from the ear, tinnitus, or a history of otitis.\par \par Subclinical hearing loss can be detected with reasonable sensitivity in the office by holding your fingers a few inches away from the patient's ear and rubbing them together softly or asking the patient to repeat a few numbers or words whispered into his or her ear. A 512-Hz tuning fork is also useful. Audiometry is more sensitive. (See "Evaluation of the patient with vertigo" and "Evaluation of hearing loss in adults".)\par \par Brainstem signs — The presence of additional neurologic signs strongly suggests the presence of a central vestibular lesion. Symptoms such as staggering or ataxic gait, vomiting, headache, double vision, visual loss, slurred speech, numbness of the face or body, weakness, clumsiness, or incoordination should be reviewed with the patient.\par \par A careful neurologic examination should be performed for cranial nerve abnormalities, Neena syndrome, motor or sensory changes, dysmetria, or abnormal reflexes. (See "The detailed neurologic examination in adults".) However, the absence of other neurologic findings does not entirely exclude a central process. (See "Evaluation of the patient with vertigo".)\par \par \par PRESYNCOPE\par Presyncope is the prodromal symptom of fainting or a near faint. Presyncope occurs more commonly than syncope. It usually lasts for seconds to minutes and is often recognized by the patient as "nearly blacking out" or "nearly fainting." When the symptoms are less intense, their description may be less clear. Patients may also report lightheadedness, a feeling of warmth, diaphoresis, nausea, and visual blurring occasionally proceeding to blindness. An observation of pallor by onlookers usually indicates presyncope. Presyncope usually occurs when the patient is standing or seated upright and not when supine (if the latter, one should suspect a cardiac arrhythmia rather than hypotension).\par \par A history of cardiac disease, including cardiac dysrhythmias (tachycardias or bradyarrhythmias), coronary heart disease, congestive heart failure, is relevant [21]. The patient should be asked specifically about palpitations, chest discomfort, or dyspnea (although this may suggest anxiety as an alternative cause as well).\par \par The etiology and evaluation of presyncope are the same as for syncope. Orthostatic hypotension, cardiac arrhythmias, and vasovagal attacks are some of the more common causes (table 4A-B). (See "Syncope in adults: Clinical manifestations and diagnostic evaluation".)\par \par \par DISEQUILIBRIUM\par Disequilibrium is a sense of imbalance that occurs primarily when walking. Chronic dizziness or disequilibrium can cause significant impairment of physical and social functioning, particularly in the elderly [22,23].\par \par Disequilibrium may result from peripheral neuropathy, a musculoskeletal disorder interfering with gait, vestibular disorder, a cerebellar disorder, and/or cervical spondylosis [10,11]. Patients with Parkinson disease frequently suffer from disequilibrium and are subject to postural hypotension as well as imbalance [24]. Cervical spondylosis may be associated with dizziness that is apparently related to a disturbance in postural control [25], although this is not a universally accepted cause of dizziness [2]. Visual impairment, whether from underlying eye disease or poor lighting, typically exacerbates the sense of imbalance. This is also true of cerebellar disorders. Cerebellar disorders can affect mainly gait, but often have associated dysarthria and eye signs, such as gaze-evoked nystagmus, poor smooth pursuit, and downbeat nystagmus. If the cerebellar hemisphere is also involved, there will be incoordination of limbs.\par \par The physician should inquire about symptoms of neurologic and gait disorders, especially those suggestive of Parkinsonism, cerebellar incoordination, or peripheral neuropathy. In the series cited above, few patients volunteered that their dizziness was associated with walking, standing, turning, or falling; most with disequilibrium required observation of gait and a neurologic examination to identify the diagnosis [2]. (See "The detailed neurologic examination in adults".)\par \par \par NONSPECIFIC DIZZINESS\par Nonspecific dizziness is often difficult for the patient to describe. He or she may simply insist, "I am dizzy." Patients may choose from suggested descriptions to say they are "giddy" or "lightheaded", however, they may also endorse a fainting or spinning sensation.\par \par Psychiatric disorders may be the primary cause of nonspecific dizziness in some cases. One-quarter of such individuals had major depression, one-quarter had generalized anxiety or panic disorder, and the remainder had somatization disorder, alcohol dependence, and/or personality disorder in one series [2]. Other series report higher rates of panic disorder [26]. (See appropriate topic reviews for diagnostic discussions of these illnesses.) Ill-defined disorders such as fibromyalgia have also been associated with dizziness and vertigo [27]. (See "Clinical manifestations and diagnosis of fibromyalgia in adults".) Patients who have a chief cause of dizziness that is not psychiatric may also have a psychiatric disorder as a contributing factor. Psychotherapy may help manage this type of dizziness. A meta-analysis of three randomized trials that used cognitive behavioral therapy in combination with relaxation techniques or vestibular rehabilitation found that therapy was helpful in managing dizziness in the short term, although not associated anxiety and depression [28].\par \par Nonspecific dizziness is sometimes related to hyperventilation. This usually occurs in settings that are at least mildly stressful. Dizziness that accompanies hyperventilation, anxiety, or depression often builds up gradually, waxes and wanes over a period of 20 minutes or longer, and gradually resolves. There may be no sensation of "air hunger" since these patients are hyperventilating only to a slight degree. \par \par Less intense versions of presyncope or vertigo may be experienced by the patient as nonspecific dizziness. (See 'Vertigo' above and 'Presyncope' above.) Nonspecific dizziness (as well as vertigo) may follow head trauma or whiplash injuries [29]. Hypoglycemic episodes may also produce a nonspecific sensation of dizziness as the chief symptom [30]. In addition, patients should be asked about medications, especially antidepressants and anticholinergics; a variety of medications produce dizziness as a side effect or as a symptom of abrupt drug withdrawal [31,32].\par \par There are no physical signs that are diagnostic of nonspecific dizziness. Most patients are healthy, young individuals without detectable disease involving the neurologic, cardiovascular, or otolaryngologic systems. Purposeful hyperventilation is one means to confirm that diagnosis. The patient is coached to hyperventilate until he or she becomes dizzy, then to identify whether or not the dizziness mimics spontaneously occurring symptoms. If so, the patient will be convinced, as well as the physician, that hyperventilation is the etiology. However, the examiner must observe the eyes of the patient to see if there is nystagmus; some pathologic vestibular lesions are exacerbated or unmasked by hyperventilation. If nystagmus is seen, the diagnosis is a vestibular lesion, not hyperventilation.\par \par Reproducing symptoms by hyperventilation is often reassuring to the patient and in itself therapeutic. It is possible for individuals to learn to breathe less deeply and through the nose, thereby limiting hyperventilation. If patients understand that a number of minutes must elapse before the symptoms resolve, they can spontaneously abort their own attacks. Treatment of anxiety or depression with pharmacotherapy should be based upon the symptoms of these disorders, not necessarily upon the presence of nonspecific dizziness.\par \par \par DIZZINESS IN OLDER PATIENTS\par Dizziness in the older adult deserves specific mention because of its high prevalence, up to 38 percent in some series, and its attendant risk of falls, functional disability, institutionalization, and even death [33,34]. Assessment of dizziness in older patients is challenging because it is frequently attributable to multiple problems, including vertigo, cerebrovascular disease, neck disorders, physical deconditioning, and medications [35]. Visual impairment from cataracts and other conditions is common in older adults and likely exacerbates the disability that is associated with dizziness [11]. One study found that 44 percent of patients aged 65 to 95 years had more than one condition causing dizziness [36]. Some call this entity multiple-sensory defect dizziness.\par \par In a population-based study of 1087 community living individuals 72 years of age or older, 261 (24 percent) reported having an episode of dizziness during the two months prior to study onset and that the dizziness (whether persistent or intermittent) had been present for at least one month [33]. The investigators found seven characteristics that were independently associated with dizziness on multivariate analysis:\par \par ?Anxiety trait\par \par ?Depressive symptoms\par \par ?Impaired balance (path deviation and time to turn [place] greater than four seconds)\par \par ?Past myocardial infarction\par \par ?Postural hypotension (mean decrease in blood pressure =20 percent)\par \par ?Five or more medications\par \par ?Impaired hearing\par \par \par Only 10 percent of study participants with none of these seven characteristics reported dizziness. The prevalence of dizziness in those who had one, two, three, four, and five or more of these characteristics was 18, 27, 33, 50, and 68 percent, respectively. The authors concluded that while dizziness in some older individuals may primarily be due to one problem, a number of older patients likely have a multifactorial etiology.\par \par One study of 417 patients aged 65 to 95 years found that most, 69 percent, of patients had presyncope-type dizziness [36]. Underlying cardiovascular disease was the most common contributing factor in 57 percent, followed by peripheral vestibulopathy (14 percent), and psychiatric conditions (10 percent). \par \par Drug side effects are a contributor to dizziness in 20 to 25 percent of older patients [34,36].\par \par Treatment in these individuals should be directed at the most remediable problems [35]. Physicians should also ask about falling or dizziness while driving, which would require intervention to prevent injury. (See "Falls in older persons: Risk factors and patient evaluation".)\par \par \par INFORMATION FOR PATIENTS\par [facility] offers two types of patient education materials, “The Basics” and “Beyond the Basics.” The Basics patient education pieces are written in plain language, at the 5th to 6th grade reading level, and they answer the four or five key questions a patient might have about a given condition. These articles are best for patients who want a general overview and who prefer short, easy-to-read materials. Beyond the Basics patient education pieces are longer, more sophisticated, and more detailed. These articles are written at the 10th to 12th grade reading level and are best for patients who want in-depth information and are comfortable with some medical jargon.\par \par Here are the patient education articles that are relevant to this topic. We encourage you to print or e-mail these topics to your patients. (You can also locate patient education articles on a variety of subjects by searching on “patient info” and the keyword(s) of interest.)\par \par ?Basics topics (see "Patient education: Vertigo (a type of dizziness) (The Basics)")\par \par \par ?Beyond the Basics topics (see "Patient education: Dizziness and vertigo (Beyond the Basics)")\par \par \par \par \par SUMMARY\par A complaint of dizziness is nonspecific and requires further clarification.\par \par ?The cause of dizziness (vertigo, presyncope, disequilibrium, or nonspecific dizziness) is best elucidated by the history and confirmed by physical examination. (See 'General approach' above.)\par \par \par ?Most patients with dizziness have vertigo. Most patients experience vertigo as an illusion of movement, not necessarily spinning, of themselves or the environment. A key historical aspect of vertigo is exacerbation by head movement (See 'Vertigo' above.)\par \par \par ?The presence of nystagmus suggests that the dizziness is vertigo. Associated hearing loss or tinnitus suggests peripheral vertigo; associated brainstem signs suggest central vertigo. (See 'Vertigo' above.)\par \par \par ?Presyncope is usually experienced as a sensation of impending faint. This diagnosis is suggested by the occurrence of dizziness only in the upright posture, in patients with cardiac disease, and when associated pallor is described by onlookers. (See 'Presyncope' above.)\par \par \par ?Dizziness that represents disequilibrium or a sense of imbalance may be the presenting symptom of a peripheral neuropathy, parkinsonism, cerebellar disease, and/or cervical myelopathy. (See 'Disequilibrium' above.)\par \par \par ?Nonspecific dizziness is ill described and has a wide differential diagnosis that may include milder forms of vertigo, presyncope, and disequilibrium, as well as medication side effects, psychiatric disease, and metabolic derangements. (See 'Nonspecific dizziness' above.)\par \par \par ?Older patients often have multiple etiologic contributors to their dizziness. (See 'Dizziness in older patients' above.)\par

## 2019-06-11 ENCOUNTER — TRANSCRIPTION ENCOUNTER (OUTPATIENT)
Age: 39
End: 2019-06-11

## 2019-06-16 ENCOUNTER — FORM ENCOUNTER (OUTPATIENT)
Age: 39
End: 2019-06-16

## 2019-06-17 ENCOUNTER — APPOINTMENT (OUTPATIENT)
Dept: ULTRASOUND IMAGING | Facility: HOSPITAL | Age: 39
End: 2019-06-17
Payer: COMMERCIAL

## 2019-06-17 ENCOUNTER — OUTPATIENT (OUTPATIENT)
Dept: OUTPATIENT SERVICES | Facility: HOSPITAL | Age: 39
LOS: 1 days | End: 2019-06-17
Payer: COMMERCIAL

## 2019-06-17 DIAGNOSIS — E04.1 NONTOXIC SINGLE THYROID NODULE: ICD-10-CM

## 2019-06-17 PROCEDURE — 76536 US EXAM OF HEAD AND NECK: CPT

## 2019-06-17 PROCEDURE — 76536 US EXAM OF HEAD AND NECK: CPT | Mod: 26

## 2019-06-20 ENCOUNTER — NON-APPOINTMENT (OUTPATIENT)
Age: 39
End: 2019-06-20

## 2019-06-20 ENCOUNTER — APPOINTMENT (OUTPATIENT)
Dept: CARDIOLOGY | Facility: CLINIC | Age: 39
End: 2019-06-20
Payer: COMMERCIAL

## 2019-06-20 VITALS
SYSTOLIC BLOOD PRESSURE: 136 MMHG | DIASTOLIC BLOOD PRESSURE: 89 MMHG | HEART RATE: 82 BPM | TEMPERATURE: 98.2 F | WEIGHT: 169 LBS | OXYGEN SATURATION: 98 % | HEIGHT: 60 IN | BODY MASS INDEX: 33.18 KG/M2 | RESPIRATION RATE: 16 BRPM

## 2019-06-20 DIAGNOSIS — R94.31 ABNORMAL ELECTROCARDIOGRAM [ECG] [EKG]: ICD-10-CM

## 2019-06-20 DIAGNOSIS — R00.2 PALPITATIONS: ICD-10-CM

## 2019-06-20 DIAGNOSIS — I49.8 OTHER SPECIFIED CARDIAC ARRHYTHMIAS: ICD-10-CM

## 2019-06-20 PROCEDURE — 99204 OFFICE O/P NEW MOD 45 MIN: CPT

## 2019-06-20 PROCEDURE — 93000 ELECTROCARDIOGRAM COMPLETE: CPT

## 2019-06-23 PROBLEM — I49.8 SINUS ARRHYTHMIA: Status: ACTIVE | Noted: 2019-06-05

## 2019-07-30 ENCOUNTER — APPOINTMENT (OUTPATIENT)
Dept: OTOLARYNGOLOGY | Facility: CLINIC | Age: 39
End: 2019-07-30
Payer: COMMERCIAL

## 2019-07-30 VITALS
SYSTOLIC BLOOD PRESSURE: 128 MMHG | DIASTOLIC BLOOD PRESSURE: 89 MMHG | HEART RATE: 83 BPM | BODY MASS INDEX: 33.2 KG/M2 | TEMPERATURE: 98.4 F | OXYGEN SATURATION: 97 % | WEIGHT: 170 LBS

## 2019-07-30 PROCEDURE — 99214 OFFICE O/P EST MOD 30 MIN: CPT

## 2019-07-30 NOTE — HISTORY OF PRESENT ILLNESS
[de-identified] : This is a patient referred by Dr King for thyroid nodule. This was found 5 months ago during physical exam/US.\par No dysphagia, dysphonia or dyspnea.\par Patient denies h/o radiation and has no family h/o thyroid cancer.\par US from [facility] showed a 9 x 5 x 9 mm complex cystic and solid thyroid nodule US from 7/13/18 showed a 9 x 5 x 6 mm nodule.\par 6/2019 US. showed stable nodule up to 0.9cm nodule\par Complete review of systems which was performed during a previous encounter was reviewed with the patient and there are no changes except as stated in the HPI section.\par

## 2019-07-30 NOTE — CONSULT LETTER
[Dear  ___] : Dear  [unfilled], [Courtesy Letter:] : I had the pleasure of seeing your patient, [unfilled], in my office today. [Please see my note below.] : Please see my note below. [Consult Closing:] : Thank you very much for allowing me to participate in the care of this patient.  If you have any questions, please do not hesitate to contact me. [Sincerely,] : Sincerely, [FreeTextEntry3] : \par Darien Pompa MD, FACS\par \par Otolaryngology-Head and Neck Surgery\par [employer] [facility]\par  [FreeTextEntry2] : Dr Ed King

## 2019-09-23 ENCOUNTER — APPOINTMENT (OUTPATIENT)
Dept: NEUROLOGY | Facility: CLINIC | Age: 39
End: 2019-09-23
Payer: COMMERCIAL

## 2019-09-23 VITALS — BODY MASS INDEX: 32.98 KG/M2 | HEIGHT: 60 IN | WEIGHT: 168 LBS

## 2019-09-23 VITALS
OXYGEN SATURATION: 98 % | SYSTOLIC BLOOD PRESSURE: 131 MMHG | DIASTOLIC BLOOD PRESSURE: 93 MMHG | TEMPERATURE: 98.4 F | HEART RATE: 90 BPM

## 2019-09-23 PROCEDURE — 99205 OFFICE O/P NEW HI 60 MIN: CPT

## 2019-09-23 NOTE — CONSULT LETTER

## 2019-09-23 NOTE — REVIEW OF SYSTEMS
[As Noted in HPI] : as noted in HPI [Fever] : no fever [Anxiety] : no anxiety [Depression] : no depression [Eyesight Problems] : no eyesight problems [Loss Of Hearing] : no hearing loss [Chest Pain] : no chest pain [Palpitations] : no palpitations [Shortness Of Breath] : no shortness of breath [Vomiting] : no vomiting [Incontinence] : no incontinence [Joint Pain] : no joint pain [Itching] : no itching [Muscle Weakness] : no muscle weakness [Easy Bleeding] : no tendency for easy bleeding

## 2019-09-23 NOTE — PHYSICAL EXAM
[General Appearance - Alert] : alert [General Appearance - In No Acute Distress] : in no acute distress [Person] : oriented to person [Place] : oriented to place [Time] : oriented to time [Registration Intact] : recent registration memory intact [Concentration Intact] : normal concentrating ability [Visual Intact] : visual attention was ~T not ~L decreased [Naming Objects] : no difficulty naming common objects [Repeating Phrases] : no difficulty repeating a phrase [Fluency] : fluency intact [Comprehension] : comprehension intact [Cranial Nerves Optic (II)] : visual acuity intact bilaterally,  visual fields full to confrontation, pupils equal round and reactive to light [Vocabulary] : adequate range of vocabulary [Cranial Nerves Trigeminal (V)] : facial sensation intact symmetrically [Cranial Nerves Oculomotor (III)] : extraocular motion intact [Cranial Nerves Facial (VII)] : face symmetrical [Cranial Nerves Glossopharyngeal (IX)] : tongue and palate midline [Cranial Nerves Vestibulocochlear (VIII)] : hearing was intact bilaterally [Cranial Nerves Accessory (XI - Cranial And Spinal)] : head turning and shoulder shrug symmetric [Cranial Nerves Hypoglossal (XII)] : there was no tongue deviation with protrusion [Motor Tone] : muscle tone was normal in all four extremities [Motor Strength] : muscle strength was normal in all four extremities [Sensation Tactile Decrease] : light touch was intact [Involuntary Movements] : no involuntary movements were seen [Abnormal Walk] : normal gait [Balance] : balance was intact [1+] : Ankle jerk left 1+ [Full Pulse] : the pedal pulses are present [Plantar Reflex Right Only] : normal on the right [Coordination - Dysmetria Impaired Heel-to-Shin Bilateral] : not present [Coordination - Dysmetria Impaired Finger-to-Nose Bilateral] : not present [Plantar Reflex Left Only] : normal on the left [FreeTextEntry5] : fundi not visualized

## 2019-09-23 NOTE — HISTORY OF PRESENT ILLNESS
[FreeTextEntry1] : The patient is here for evaluation of dizziness described as lightheadedness occurred a few months ago. The patient was feeling lightheaded and had blurry position, without loss of consciousness, resolved. There was no double vision or loss of vision. No difficulty with balance or walking. No difficulty speaking or swallowing. No vertigo. No focal weakness or sensory loss. The patient does not drink enough water. She does not remember what she was doing when she had the dizziness\par \par She had a febrile seizures as a baby. She has not had meningitis. No loss of consciousness or tonic-clonic or seizures since then. No known history of seizures after a febrile seizure as a child.

## 2019-09-23 NOTE — ASSESSMENT
[FreeTextEntry1] : Dizziness of unknown etiology, will get MRI and MRA of the head and carotid duplex to rule out stroke, vertebral basilar insufficiency as well as carotid stenosis. The patient has been advised to stay well hydrated.\par \par History of febrile seizure as a baby, asymptomatic since then.

## 2019-11-12 ENCOUNTER — TRANSCRIPTION ENCOUNTER (OUTPATIENT)
Age: 39
End: 2019-11-12

## 2019-11-18 ENCOUNTER — APPOINTMENT (OUTPATIENT)
Dept: INTERNAL MEDICINE | Facility: CLINIC | Age: 39
End: 2019-11-18
Payer: COMMERCIAL

## 2019-11-18 VITALS
HEIGHT: 60 IN | BODY MASS INDEX: 32.39 KG/M2 | SYSTOLIC BLOOD PRESSURE: 133 MMHG | WEIGHT: 165 LBS | HEART RATE: 95 BPM | TEMPERATURE: 97.9 F | DIASTOLIC BLOOD PRESSURE: 91 MMHG | OXYGEN SATURATION: 97 %

## 2019-11-18 VITALS
DIASTOLIC BLOOD PRESSURE: 91 MMHG | WEIGHT: 165 LBS | OXYGEN SATURATION: 97 % | BODY MASS INDEX: 32.39 KG/M2 | HEIGHT: 60 IN | SYSTOLIC BLOOD PRESSURE: 133 MMHG | RESPIRATION RATE: 16 BRPM | HEART RATE: 95 BPM | TEMPERATURE: 97.9 F

## 2019-11-18 DIAGNOSIS — J01.00 ACUTE MAXILLARY SINUSITIS, UNSPECIFIED: ICD-10-CM

## 2019-11-18 PROCEDURE — 99214 OFFICE O/P EST MOD 30 MIN: CPT

## 2019-11-18 NOTE — HEALTH RISK ASSESSMENT
[Intercurrent Urgi Care visits] : went to urgent care [] : No [No] : No [No falls in past year] : Patient reported no falls in the past year [0] : 2) Feeling down, depressed, or hopeless: Not at all (0) [de-identified] : healthy  [[id number]] : 0

## 2019-11-18 NOTE — ASSESSMENT
[FreeTextEntry1] : acute pharyngitis gargle  lozenges, culture taken  A sore throat (pharyngitis) is a common problem and usually is caused by a viral or bacterial infection. Sore throat usually resolves on its own without complications in adults, although it is important to know when to seek medical attention.\par \par Viruses can cause a sore throat and other upper respiratory infections, such as the common cold. Sore throat caused by a virus is not treated with antibiotics but instead may be treated with rest, pain medication, and other therapies aimed at relieving symptoms.\par \par Strep throat is a particular kind of pharyngitis that is caused by a bacterium known as group A Streptococcus (GAS). Strep throat is treated with a course of antibiotics.\par \par A topic that discusses sore throat in children is available separately. (See "Patient education: Sore throat in children (Beyond the Basics)".)\par \par \par SORE THROAT SYMPTOMS\par \par Viral pharyngitis — Most people with a sore throat have a virus. The most common viruses are those that cause upper respiratory infections, such as the common cold. (See "Patient education: The common cold in adults (Beyond the Basics)".)\par \par Symptoms of a viral infection can include:\par \par ?A runny or congested nose\par \par ?Irritation or redness of the eyes\par \par ?Cough, hoarseness, or soreness in the roof of the mouth\par \par \par Some viruses cause a fever and can make you feel quite ill.\par \par Strep throat — Approximately 10 percent of adults with a sore throat have strep throat. Signs and symptoms of strep throat include the following (figure 1):\par \par ?Pain in the throat\par \par ?Fever (temperature greater than 100.4ºF, or 38ºC)\par \par ?Enlarged lymph glands in the neck\par \par ?White patches of pus on the side or back of the throat\par \par ?No cough, runny nose, or irritation/redness of the eyes\par \par \par Other infections — Many other less common but more serious infections can cause a sore throat, including mononucleosis (mono), influenza (the flu), Neisseria gonococcus (gonorrhea), human immunodeficiency virus (HIV), and others. (See "Patient education: Symptoms of HIV infection (Beyond the Basics)" and "Patient education: Gonorrhea (Beyond the Basics)" and "Patient education: Influenza symptoms and treatment (Beyond the Basics)" and "Patient education: Infectious mononucleosis (mono) in adults and adolescents (Beyond the Basics)".)\par \par When to seek urgent help — See your doctor or nurse immediately if you have a sore throat along with any of the following:\par \par ?Difficulty breathing\par \par ?Skin rash\par \par ?Drooling because you cannot swallow\par \par ?Swelling of the neck or tongue\par \par ?Stiff neck or difficulty opening the mouth\par \par ?Underlying chronic illness/medication that may impair your immune system\par \par \par \par SORE THROAT DIAGNOSIS\par Most people with a sore throat get better without treatment. There is no specific treatment for a sore throat caused by usual cold viruses.\par \par Is it strep or not? — A combination of symptoms (fever, enlarged glands in the neck, white patches on your tonsils, and no cough) can help in determining if you have strep. If you have two or more symptoms, a rapid test or throat culture may be done. People with fewer than two symptoms usually do not need testing or treatment for strep throat, though they may benefit from treatment with modalities for symptom reduction.\par \par Rapid test — The rapid test determines if there are Streptococcus bacteria on a throat swab. The test may be done in a clinician's office, and the results are available within a few minutes. The test is accurate in most cases, although a small percentage of tests are falsely negative (the bacteria are present but the test is negative) [1].\par \par Throat culture — A throat culture involves swabbing the throat, sending the swab to a laboratory, and waiting 24 to 48 hours for the results. Throat cultures are slightly more accurate than the rapid test.\par \par \par TREATMENT OF SORE THROAT\par \par Sore throat treatment — Antibiotics do not help throat pain caused by a virus and are not recommended. Inappropriate use of antibiotics for viral illness can unnecessarily expose patients to side effects like diarrhea, rash, or more serious allergic reactions. Sore throat caused by viral infections usually lasts four to five days. During this time, treatments to reduce pain may be helpful. Several therapies can help to relieve throat pain.\par \par Pain medication — Over-the-counter pain relievers such as acetaminophen (Tylenol) or a nonsteroidal anti-inflammatory agent such as ibuprofen or naproxen (Motrin or Aleve) have been shown to provide fast and effective relief of sore throat pain.\par \par Oral steroids are not routinely used because steroids come with (potentially serious) side effects, the benefit in treating sore throat pain is limited, and over-the-counter treatments help most patients.\par \par Oral rinses — Salt-water gargles are an old standby for throat pain. It is not clear that salt water works to relieve pain, but it is unlikely to be harmful. Most recipes suggest 1/4 to 1/2 teaspoon (1.5 to 3.0 g) of salt per 1 cup (8 ounces or 250 mL) of warm water.\par \par Sprays — Sprays containing topical anesthetics (eg, benzocaine, phenol) are available to treat sore throat. However, such sprays are no more effective than sucking on hard candy.\par \par Lozenges — A variety of lozenges (cough drops) containing topical anesthetics are available to treat throat pain or relieve dryness. Lozenges may persist longer in the throat than sprays or gargles and, thus, may be more effective for symptom relief [2].\par \par Other treatments — Other treatments that may help with throat pain include sipping warm beverages (eg, honey or lemon tea, chicken soup), cold beverages, or eating cold or frozen desserts (eg, ice cream, popsicles).\par \par Alternative therapies — Health food stores, vitamin outlets, and internet websites offer alternative treatments for relief of sore throat pain. We do not recommend these type of treatments due to the risks of contamination with pesticides/herbicides, inaccurate labeling and dosing information, and a lack of studies showing that these treatments are safe and effective.\par \par Strep throat — Although strep throat typically resolves on its own within two to five days, treatment with antibiotics is recommended for adults whose rapid test or throat culture is positive for strep throat [3].\par \par Penicillin, or an antibiotic related to penicillin, is the treatment of choice for strep throat. It is usually given in pill or liquid form two to four times per day for 10 days. A one-time injection of penicillin is also available. People who are allergic to penicillin are given an alternate antibiotic. It is important to finish the entire course of treatment to completely eliminate the infection.\par \par If symptoms do not begin to improve or if they worsen by three days of antibiotic treatment, you should see your doctor or nurse again.\par \par Return to work/school — If you have been diagnosed with strep throat, stay home from work or school until you have completed 24 hours of antibiotics. Within 24 hours of beginning antibiotic treatment, you will feel better and will be less contagious [4].\par \par If you have a sore throat (not diagnosed as strep), you may participate in your usual activities as soon as you feel well, though practical prevention measures such as good handwashing and cough etiquette should be observed.\par \par \par SORE THROAT PREVENTION\par Handwashing is an essential and highly effective way to prevent the spread of infection. Wet your hands with water and plain soap, and rub them together for 15 to 30 seconds. Pay special attention to the fingernails, between the fingers, and the wrists. Rinse your hands thoroughly, and dry them with a clean towel.\par \par Alcohol-based hand rubs are a good alternative for disinfecting hands if a sink is not available. Hand rubs should be spread over the entire surface of hands, fingers, and wrists until dry and may be used several times. These rubs can be used repeatedly without skin irritation or loss of effectiveness. Hand rubs are available as a liquid or wipe in small, portable sizes that are easy to carry in a pocket or handbag. When a sink is available, visibly soiled hands should be washed with soap and water.\par \par Wash your hands after coughing, blowing the nose, or sneezing. While it is not always possible to avoid being near a person who is sick, avoid touching your eyes, nose, or mouth to prevent the spread of infection.\par \par In addition, tissues should be used to cover the mouth when sneezing or coughing. These used tissues should be disposed of promptly. Sneezing/coughing into your sleeve (at the inner elbow) is another way to contain sprays of saliva and secretions and will not contaminate your hands.\par  sinusitis Rhinosinusitis, or more commonly sinusitis, is the medical term for inflammation (swelling) of the lining of the sinuses and nose. The sinuses are the hollow areas within the facial bones that are connected to the nasal openings (figure 1). The sinuses are lined with mucous membranes, similar to the inside of the nose.\par \par There are two main types of sinusitis: acute and chronic. Acute sinusitis is inflammation that lasts for less than 4 weeks, subacute sinusitis lasts from 4 to 12 weeks, while chronic sinusitis lasts for more than 12 weeks. Acute sinusitis is common.\par \par This topic will discuss the causes, symptoms, and treatment of acute sinusitis in adults. Information about the common cold is also available separately. (See "Patient education: The common cold in adults (Beyond the Basics)".)\par \par \par ACUTE SINUSITIS CAUSES\par The most common cause of acute sinusitis is a viral infection associated with the common cold. This condition is also called viral sinusitis. Bacterial sinusitis occurs much less commonly, in only 0.5 to 2 percent of cases, usually as a complication of viral sinusitis.\par \par Because antibiotics are effective only against bacterial, and not viral, infections, most people with acute sinusitis do not need antibiotics and would be putting themselves at risk for medication side effects and for developing antibiotic resistance by taking them for nonbacterial sinusitis. Most adults with normal immune systems can also clear bacterial infections without antibiotics.\par \par \par ACUTE SINUSITIS SYMPTOMS\par Symptoms of acute sinusitis include:\par \par ?Thick, yellow to green discharge from the nose\par \par ?Nasal congestion or blockage\par \par ?Facial pain, pressure, or fullness\par \par \par Other acute sinusitis symptoms can include fever (temperature greater than 100.4ºF or 38ºC), fatigue, cough, difficulty or inability to smell, ear pressure or fullness, headache, and bad breath. In most cases, these symptoms develop over the course of one day and begin to improve by 7 to 10 days.\par \par \par DO I HAVE VIRAL OR BACTERIAL SINUSITIS?\par It is difficult to know if you have a viral or bacterial sinus infection initially. Studies show that duration of symptoms cannot always be used to distinguish between viral and bacterial sinusitis, even when lasting longer than 7 to 10 days.\par \par If symptoms of sinusitis last more than 10 days, or if you have symptoms that initially improve but then worsen again within the first 7 days ("double-worsening"), you may have bacterial sinusitis.\par \par \par DO I NEED TO BE EXAMINED?\par If you have one or more of the following symptoms, you should seek medical attention immediately (even if symptoms have been present for less than seven days):\par \par ?Persistent high fever (>102°F)\par \par ?Sudden, severe pain in the face or head\par \par ?Double vision or difficulty seeing\par \par ?Confusion or difficulty thinking clearly\par \par ?Swelling or redness around one or both eyes\par \par ?Stiff neck\par \par \par You may also want to see a health care provider if you have symptoms that last more than 10 days or for symptoms that initially improve and worsen again.\par \par \par ACUTE SINUSITIS TREATMENT\par Treatment for sinusitis involves symptom relief and may or may not include antibiotic therapy. You should speak with your health care provider about whether or not you need antibiotic therapy. Whether the sinusitis is bacterial or viral, it can often improve with nonantibiotic treatment, although bacterial sinusitis can also worsen and require antibiotic treatment.\par \par Symptomatic treatment — Symptomatic treatment of a sinus infection aims to relieve symptoms. These treatments do not shorten the duration of illness.\par \par Pain relief — Nonprescription pain medications, such as acetaminophen (eg, Tylenol) or ibuprofen (eg, Motrin, Advil), are recommended for pain.\par \par Nasal irrigation — Flushing the nose and sinuses with a saline solution several times per day can decrease pain associated with congestion and shorten the duration of symptoms. A variety of devices, including syringes, Neti pots, and bottle sprayers, may be used to perform nasal irrigation. Your doctor or pharmacist can recommend a nasal irrigation kit. These are available without a prescription.\par \par Nasal steroids — Nasal steroids (steroids delivered by a nasal spray) can help to reduce swelling inside the nose, usually within two to three days. These drugs have few side effects and relieve symptoms in most people.\par \par There are a number of nasal steroids available by prescription as well as a few that can be purchased without a prescription (over the counter). These drugs are all effective but differ in how frequently they must be used and how much they cost.\par \par Nasal anticholinergics — Ipratropium bromide (delivered by a nasal spray) is available by prescription and can be very effective in decreasing the symptom of runny nose and other related symptoms (eg, post-nasal drainage, sore throat). These sprays, like all medications, can interact with other medications. So, it is important that your complete medication list be reviewed by your physician before you take this medication.\par \par Other treatments\par \par ?Oral decongestants – Oral decongestants (most commonly pseudoephedrine and phenylephrine) may be helpful if you have associated symptoms of ear pain or fullness.\par \par \par ?Nasal decongestant sprays – Nasal decongestant sprays, including oxymetazoline (Afrin) and phenylephrine (Magan-Synephrine), can be used to temporarily treat congestion. However, these sprays should not be used for more than two to three days due to the risk of rebound congestion (when the nose becomes congested constantly unless the medication is used repeatedly), possible addiction, and long-term consequences of frequent use, including persistent nasal dryness and crusting, which is very difficult to treat once it has developed.\par \par \par ?Oral antihistamines – Oral antihistamines (such as diphenhydramine/Benadryl) are not proven to improve symptoms of sinusitis and can have unwanted side effects.\par \par \par ?Mucolytics – Medications to thin secretions (such as guaifenesin) may help to clear mucus.\par \par \par Observation — Observation (continuing to watch and wait) is an option for treatment for many patients. You should speak with your health care provider about whether or not this is the best option for you.\par \par Watching and waiting is a reasonable option because up to 75 percent of people with bacterial sinusitis improve within one month without antibiotics. During the watch and wait period, treatments to improve symptoms are recommended. If symptoms worsen with observation, treatment with an antibiotic is usually started. (See 'Symptomatic treatment' above.)\par \par Antibiotics — Bacterial sinusitis does not always need to be treated with antibiotics, as many patients improve without antibiotics. You should speak with your health care provider about whether or not you need antibiotics. Patients who have worsening symptoms after being managed with watchful waiting are usually started on antibiotics. Treatments to relieve symptoms are also recommended during antibiotic treatment. (See 'Observation' above and 'Symptomatic treatment' above.)\par \par One of the least expensive and most effective antibiotics for sinusitis is amoxicillin. An alternate antibiotic will be prescribed if you are allergic to penicillin or if you live in an area where resistance to that particular antibiotic is high. Regardless of which antibiotic is prescribed, it is important to follow the dosing instructions carefully and to finish the entire course of treatment. Taking the medication less often than prescribed or stopping the medication early can lead to complications, such as a recurrent infection.\par \par What if I do not improve with treatment? — If you do not improve or if you worsen after a course of antibiotics, you should be reexamined. You may need a different antibiotic or further evaluation with imaging or an exam of the inside of the sinuses.\par \par In some cases, symptoms of sinusitis improve but then recur. This is usually because the infection was not completely eliminated by the antibiotic. An alternate antibiotic, extended antibiotic treatment, and/or further testing may be recommended, depending upon your individual situation\par  If symptoms worsen call me or go to er.    rest plenty of liquids avoid milk products  oil diffuser with breathe easy.

## 2019-11-18 NOTE — HISTORY OF PRESENT ILLNESS
[FreeTextEntry1] : sick [de-identified] : Pt states for past two weeks she has had coughing and bringing up phlegm thick yellow green and nasal congestion and feeling as if her ear was clogged.  She went to [facility] last week and was given z agata and didn’t take.  She states her head is pounding and having wheezing.  She has a sore throat, no fever but had chills.  Her children were sick.  She was taking mucinex flonase and her inhaler.  She was taking Tylenol and advil.   No chest pain .

## 2019-11-18 NOTE — PHYSICAL EXAM
[No JVD] : no jugular venous distention [PERRL] : pupils equal round and reactive to light [Rate ___] : at [unfilled] breaths per minute [Normal Rhythm/Effort] : normal respiratory rhythm and effort [Clear Bilaterally] : the lungs were clear to auscultation bilaterally [Normal to Percussion] : the lungs were normal to percussion [Normal Rate] : normal rate  [Normal S1, S2] : normal S1 and S2 [Regular Rhythm] : with a regular rhythm [No Edema] : there was no peripheral edema [No Extremity Clubbing/Cyanosis] : no extremity clubbing/cyanosis [Cervical Lymph Nodes Enlarged Anterior Bilaterally] : enlarged nodes bilaterally [Normal] : affect was normal and insight and judgment were intact [de-identified] : congested  [de-identified] : deviated septum to right nasal erythema and edema pain over maxillary sinus and dullness to percussion, right tm bulging  [de-identified] : enlarge lymphnodes on right and left ant cervical mobils small soft

## 2019-11-18 NOTE — REVIEW OF SYSTEMS
[Chills] : chills [Sore Throat] : sore throat [Nasal Discharge] : nasal discharge [Cough] : cough [Wheezing] : wheezing [Negative] : Heme/Lymph

## 2019-11-20 LAB — BACTERIA THROAT CULT: NORMAL

## 2019-11-26 ENCOUNTER — APPOINTMENT (OUTPATIENT)
Dept: OTOLARYNGOLOGY | Facility: CLINIC | Age: 39
End: 2019-11-26

## 2019-12-19 ENCOUNTER — APPOINTMENT (OUTPATIENT)
Dept: INTERNAL MEDICINE | Facility: CLINIC | Age: 39
End: 2019-12-19

## 2020-03-04 ENCOUNTER — FORM ENCOUNTER (OUTPATIENT)
Age: 40
End: 2020-03-04

## 2020-03-04 ENCOUNTER — OUTPATIENT (OUTPATIENT)
Dept: OUTPATIENT SERVICES | Facility: HOSPITAL | Age: 40
LOS: 1 days | End: 2020-03-04

## 2020-03-04 DIAGNOSIS — E04.1 NONTOXIC SINGLE THYROID NODULE: ICD-10-CM

## 2020-03-05 ENCOUNTER — APPOINTMENT (OUTPATIENT)
Dept: ULTRASOUND IMAGING | Facility: HOSPITAL | Age: 40
End: 2020-03-05
Payer: COMMERCIAL

## 2020-03-05 ENCOUNTER — OUTPATIENT (OUTPATIENT)
Dept: OUTPATIENT SERVICES | Facility: HOSPITAL | Age: 40
LOS: 1 days | End: 2020-03-05
Payer: COMMERCIAL

## 2020-03-05 DIAGNOSIS — E04.1 NONTOXIC SINGLE THYROID NODULE: ICD-10-CM

## 2020-03-05 PROCEDURE — 76536 US EXAM OF HEAD AND NECK: CPT

## 2020-03-05 PROCEDURE — 76536 US EXAM OF HEAD AND NECK: CPT | Mod: 26

## 2020-03-17 ENCOUNTER — APPOINTMENT (OUTPATIENT)
Dept: OTOLARYNGOLOGY | Facility: CLINIC | Age: 40
End: 2020-03-17

## 2020-06-02 ENCOUNTER — APPOINTMENT (OUTPATIENT)
Dept: INTERNAL MEDICINE | Facility: CLINIC | Age: 40
End: 2020-06-02

## 2020-07-28 ENCOUNTER — APPOINTMENT (OUTPATIENT)
Dept: PULMONOLOGY | Facility: CLINIC | Age: 40
End: 2020-07-28
Payer: COMMERCIAL

## 2020-07-28 DIAGNOSIS — Z88.9 ALLERGY STATUS TO UNSPECIFIED DRUGS, MEDICAMENTS AND BIOLOGICAL SUBSTANCES: ICD-10-CM

## 2020-07-28 DIAGNOSIS — Z91.018 ALLERGY TO OTHER FOODS: ICD-10-CM

## 2020-07-28 DIAGNOSIS — Z87.09 PERSONAL HISTORY OF OTHER DISEASES OF THE RESPIRATORY SYSTEM: ICD-10-CM

## 2020-07-28 PROCEDURE — 99204 OFFICE O/P NEW MOD 45 MIN: CPT | Mod: 95

## 2020-07-28 RX ORDER — AZITHROMYCIN 250 MG/1
250 TABLET, FILM COATED ORAL
Qty: 6 | Refills: 0 | Status: DISCONTINUED | COMMUNITY
Start: 2019-11-11 | End: 2020-07-28

## 2020-07-28 RX ORDER — ALBUTEROL SULFATE 90 UG/1
108 (90 BASE) INHALANT RESPIRATORY (INHALATION)
Qty: 1 | Refills: 3 | Status: ACTIVE | COMMUNITY
Start: 2018-08-07 | End: 1900-01-01

## 2020-07-28 RX ORDER — BENZONATATE 100 MG/1
100 CAPSULE ORAL 3 TIMES DAILY
Qty: 21 | Refills: 0 | Status: DISCONTINUED | COMMUNITY
Start: 2019-11-18 | End: 2020-07-28

## 2020-07-28 NOTE — HISTORY OF PRESENT ILLNESS
[Medical Office: ([facility])___] : at the medical office located in  [Home] : at home, [unfilled] , at the time of the visit. [Verbal consent obtained from patient] : the patient, [unfilled] [TextBox_4] : Ms. PURVIS is a 39 year old female presenting to the office  via video call today for initial pulmonary evaluation. Her chief complaint is\par - she has been fine breathing wise, but last week she felt very off, shes not sure if it was an anxiety attack. It felt like her chest was tight and it went around to her back. she was able to breathe but she had to catch her breath. She was home but then she was driivng to her parents house, she sat in a chair and drank some water. she felt a bit dizzy due to being nervous. She's finding it a bit hard to explain. At night the symptom came back, but she was able to sleep fine. When she got up she still felt the tightness. It subsided, she started to feel better. But then another day it came back whenever she ate something. She does not know if its a gastrointestinal thing. \par - she's feeling a bit better now but is still concerned \par - the symptom felt more like chest tightness rather than pressure \par - in general she has been sleeping well \par - she does not think she is snoring\par - her sinuses are fine, a little dry in the morning though \par - energy level is about 6 out of 10 \par - her bowels are regular \par - she sometimes has a lump in the throat she feels she needs to clear \par - she is now working remotely from home \par - before the symptoms started, she feels like the day it started, she ate something and she feels it went down the wrong tube or if she did something when she was coughing. \par -she denies any headaches, nausea, vomiting, fever, chills, sweats,  constipation, dysphagia, dizziness, leg swelling, leg pain, itchy eyes, itchy ears, heartburn, reflux, sour taste in the mouth, myalgias or arthralgias.\par

## 2020-07-28 NOTE — REASON FOR VISIT
[Initial] : an initial visit [TextBox_44] : via video call - SOB, allergies, asthma, low vit d, GERD

## 2020-07-28 NOTE — ASSESSMENT
[FreeTextEntry1] : Ms. PURVIS is a 39 year old female with a history of  chronic interstitial cystitis, allergies, asthma, sinus arrhythmia, thyroid nodule, and low vitamin D, who presents into the office today via video call  for pulmonary evaluation SOB \par \par \par The patient's shortness of breath is multifactorial due to:\par -pulmonary disease \par      - mild asthma \par -poor breathing mechanics \par -overweight/out of shape\par -?cardiac disease \par \par \par Problem 1: mild asthma (Flair, related to reflux)\par - Add Symbicort (160) 2 inhalations BID\par -Add ProAir rescue inhaler 2 inhalations before exercise \par -Asthma is believed to be caused by inherited (genetic) and environmental factor, but its exact cause is unknown. Asthma may be triggered by allergens, lung infections, or irritants in the air. Asthma triggers are different for each person\par -Inhaler technique reviewed as well as oral hygiene techniques reviewed with patient. Avoidance of cold air, extremes of temperature, rescue inhaler should be used before exercise. Order of medication reviewed with patient. Recommended use of a cool mist humidifier in the bedroom.\par \par  \par Problem 2: GERD\par -continue Pepcid 40 mg QHS \par -Rule of 2s: avoid eating too much, eating too fast, eating too late, eating too spicy, eating too lousy, eating two hours before bed.\par -Things to avoid including overeating, spicy foods, tight clothing, eating within three hours of bed, this list is not all inclusive. \par -For treatment of reflux, possible options discussed including diet control, H2 blockers, PPIs, as well as coating motility agents discussed as treatment options. Timing of meals and proximity of last meal to sleep were discussed. If symptoms persist, a formal gastrointestinal evaluation is needed.\par \par \par Problem 3: Allergies\par -Add Flonase 1 sniff each nostril BID (PRN)\par -Add Claritin 10 mg QD (PRN)\par Environmental measures for allergies were encouraged including mattress and pillow cover, air purifier, and environmental controls. \par \par \par Problem 4: Low Vit D\par - on supplements \par Has been associated with asthma exacerbations and increased allergic symptoms. The goal based on recent information is maintaining levels between 50-70 and low normal is 30. Recommended 50,000 units every two weeks to once a month depending on the level.\par \par  \par Problem 5 : Poor Mechanics of Breathing\par - Proper breathing techniques were reviewed with an emphasis of exhalation. Patient instructed to breath in for 1 second and out for four seconds. Patient was encouraged to not talk while walking. \par \par Problem 6 : Out of shape \par -Weight loss, exercise, and diet control were discussed and are highly encouraged. Treatment options were given such as, aqua therapy, and contacting a nutritionist. Recommended to use the elliptical, stationary bike, less use of treadmill. \par \par Problem 7: Cardiac (doubtful)\par - keep a record of pulse\par - recommended to hydrate \par - follow up with cardiologist \par \par Problem 8 : Health Maintenance/COVID19 Precautions:\par - Clean your hands often. Wash your hands often with soap and water for at least 20 seconds, especially after blowing your nose, coughing, or sneezing, or having been in a public place.\par - If soap and water are not available, use a hand  that contains at least 60% alcohol.\par - To the extent possible, avoid touching high-touch surfaces in public places - elevator buttons, door handles, handrails, handshaking with people, etc. Use a tissue or your sleeve to cover your hand or finger if you must touch something.\par - Wash your hands after touching surfaces in public places.\par - Avoid touching your face, nose, eyes, etc.\par - Clean and disinfect your home to remove germs: practice routine cleaning of frequently touched surfaces (for example: tables, doorknobs, light switches, handles, desks, toilets, faucets, sinks & cell phones)\par - Avoid crowds, especially in poorly ventilated spaces. Your risk of exposure to respiratory viruses like COVID-19 may increase in crowded, closed-in settings with little air circulation if there are people in the crowd who are sick. All patients are recommended to practice social distancing and stay at least 6 feet away from others.\par - Avoid all non-essential travel including plane trips, and especially avoid embarking on cruise ships.\par -If COVID-19 is spreading in your community, take extra measures to put distance between yourself and other people to further reduce your risk of being exposed to this new virus.\par -Stay home as much as possible.\par - Consider ways of getting food brought to your house through family, social, or commercial networks\par -Be aware that the virus has been known to live in the air up to 3 hours post exposure, cardboard up to 24 hours post exposure, copper up to 4 hours post exposure, steel and plastic up to 2-3 days post exposure. Risk of transmission from these surfaces are affected by many variables.\par Immune Support Recommendations:\par -OTC Vitamin C 500mg BID \par -OTC Quercetin 250-500mg BID \par -OTC Zinc 75-100mg per day \par -OTC Melatonin 1 or 2 mg a night \par -OTC Vitamin D 1-4000mg per day \par -OTC Tonic Water 8oz per day\par Asthma and COVID19:\par You need to make sure your asthma is under control. This often requires the use of inhaled corticosteroids (and sometimes oral corticosteroids). Inhaled corticosteroids do not likely reduce your immune system’s ability to fight infections, but oral corticosteroids may. It is important to use the steps above to protect yourself to limit your exposure to any respiratory virus.\par \par Problem  9: health maintenance\par -s/p influenza vaccine\par -recommended strep pneumonia vaccines after age 65: Prevnar-13 vaccine, followed by Pneumo vaccine 23 one year following\par -recommended early intervention for URIs\par -recommended regular osteoporosis evaluations\par -recommended early dermatological evaluations\par -recommended after the age of 50 to the age of 70, colonoscopy every 5 years \par \par  Follow up in 6-8 weeks\par -he  is recommended to call with any changes, questions, or concerns.\par

## 2021-03-05 ENCOUNTER — APPOINTMENT (OUTPATIENT)
Dept: INTERNAL MEDICINE | Facility: CLINIC | Age: 41
End: 2021-03-05

## 2021-05-18 ENCOUNTER — TRANSCRIPTION ENCOUNTER (OUTPATIENT)
Age: 41
End: 2021-05-18

## 2021-07-06 ENCOUNTER — NON-APPOINTMENT (OUTPATIENT)
Age: 41
End: 2021-07-06

## 2021-07-06 ENCOUNTER — APPOINTMENT (OUTPATIENT)
Dept: CARDIOLOGY | Facility: CLINIC | Age: 41
End: 2021-07-06
Payer: COMMERCIAL

## 2021-07-06 VITALS
DIASTOLIC BLOOD PRESSURE: 97 MMHG | HEIGHT: 60 IN | HEART RATE: 79 BPM | SYSTOLIC BLOOD PRESSURE: 147 MMHG | WEIGHT: 173 LBS | BODY MASS INDEX: 33.96 KG/M2 | OXYGEN SATURATION: 97 %

## 2021-07-06 DIAGNOSIS — R00.2 PALPITATIONS: ICD-10-CM

## 2021-07-06 PROCEDURE — 93000 ELECTROCARDIOGRAM COMPLETE: CPT

## 2021-07-06 PROCEDURE — 99204 OFFICE O/P NEW MOD 45 MIN: CPT

## 2021-07-06 PROCEDURE — 99072 ADDL SUPL MATRL&STAF TM PHE: CPT

## 2021-07-06 RX ORDER — BUDESONIDE AND FORMOTEROL FUMARATE DIHYDRATE 160; 4.5 UG/1; UG/1
160-4.5 AEROSOL RESPIRATORY (INHALATION) TWICE DAILY
Qty: 3 | Refills: 1 | Status: DISCONTINUED | COMMUNITY
Start: 2020-07-28 | End: 2021-07-06

## 2021-07-06 RX ORDER — SODIUM BICARBONATE, SODIUM CHLORIDE 700; 2300 MG/3G; MG/3G
2300-700 POWDER, FOR SOLUTION NASAL
Qty: 1 | Refills: 0 | Status: DISCONTINUED | COMMUNITY
Start: 2019-11-18 | End: 2021-07-06

## 2021-07-06 RX ORDER — FLUTICASONE PROPIONATE 50 UG/1
50 SPRAY, METERED NASAL
Qty: 1 | Refills: 3 | Status: DISCONTINUED | COMMUNITY
Start: 2019-11-18 | End: 2021-07-06

## 2021-07-06 RX ORDER — FAMOTIDINE 40 MG/1
40 TABLET, FILM COATED ORAL
Qty: 90 | Refills: 1 | Status: DISCONTINUED | COMMUNITY
Start: 2020-07-28 | End: 2021-07-06

## 2021-07-17 ENCOUNTER — APPOINTMENT (OUTPATIENT)
Dept: INTERNAL MEDICINE | Facility: CLINIC | Age: 41
End: 2021-07-17
Payer: COMMERCIAL

## 2021-07-17 VITALS
HEART RATE: 93 BPM | WEIGHT: 171 LBS | HEIGHT: 60 IN | TEMPERATURE: 98 F | BODY MASS INDEX: 33.57 KG/M2 | SYSTOLIC BLOOD PRESSURE: 134 MMHG | DIASTOLIC BLOOD PRESSURE: 82 MMHG | OXYGEN SATURATION: 98 % | RESPIRATION RATE: 14 BRPM

## 2021-07-17 DIAGNOSIS — N30.10 INTERSTITIAL CYSTITIS (CHRONIC) W/OUT HEMATURIA: ICD-10-CM

## 2021-07-17 PROCEDURE — 99072 ADDL SUPL MATRL&STAF TM PHE: CPT

## 2021-07-17 PROCEDURE — 99396 PREV VISIT EST AGE 40-64: CPT

## 2021-07-17 PROCEDURE — 99386 PREV VISIT NEW AGE 40-64: CPT

## 2021-07-19 ENCOUNTER — NON-APPOINTMENT (OUTPATIENT)
Age: 41
End: 2021-07-19

## 2021-07-19 ENCOUNTER — TRANSCRIPTION ENCOUNTER (OUTPATIENT)
Age: 41
End: 2021-07-19

## 2021-07-19 NOTE — HEALTH RISK ASSESSMENT
[Very Good] : ~his/her~  mood as very good [No] : No [0] : 2) Feeling down, depressed, or hopeless: Not at all (0) [Patient reported PAP Smear was abnormal] : Patient reported PAP Smear was abnormal [] : No [MammogramDate] : 07/21 [MammogramComments] : needs more views [PapSmearDate] : 07/21 [PapSmearComments] : s/p colposcopy

## 2021-07-20 ENCOUNTER — TRANSCRIPTION ENCOUNTER (OUTPATIENT)
Age: 41
End: 2021-07-20

## 2021-07-20 ENCOUNTER — RESULT REVIEW (OUTPATIENT)
Age: 41
End: 2021-07-20

## 2021-07-20 LAB
25(OH)D3 SERPL-MCNC: 27.3 NG/ML
ALBUMIN SERPL ELPH-MCNC: 4.6 G/DL
ALP BLD-CCNC: 36 U/L
ALT SERPL-CCNC: 15 U/L
ANION GAP SERPL CALC-SCNC: 18 MMOL/L
APPEARANCE: CLEAR
AST SERPL-CCNC: 15 U/L
BACTERIA: NEGATIVE
BASOPHILS # BLD AUTO: 0.01 K/UL
BASOPHILS NFR BLD AUTO: 0.2 %
BILIRUB SERPL-MCNC: 0.4 MG/DL
BILIRUBIN URINE: NEGATIVE
BLOOD URINE: NEGATIVE
BUN SERPL-MCNC: 8 MG/DL
CALCIUM SERPL-MCNC: 9.6 MG/DL
CHLORIDE SERPL-SCNC: 105 MMOL/L
CHOLEST SERPL-MCNC: 191 MG/DL
CO2 SERPL-SCNC: 18 MMOL/L
COLOR: COLORLESS
CREAT SERPL-MCNC: 0.64 MG/DL
EOSINOPHIL # BLD AUTO: 0.08 K/UL
EOSINOPHIL NFR BLD AUTO: 1.3 %
ESTIMATED AVERAGE GLUCOSE: 100 MG/DL
FERRITIN SERPL-MCNC: 45 NG/ML
FOLATE SERPL-MCNC: 18.4 NG/ML
GLUCOSE QUALITATIVE U: NEGATIVE
GLUCOSE SERPL-MCNC: 89 MG/DL
HBA1C MFR BLD HPLC: 5.1 %
HCT VFR BLD CALC: 40 %
HDLC SERPL-MCNC: 55 MG/DL
HGB BLD-MCNC: 13.1 G/DL
HYALINE CASTS: 2 /LPF
IMM GRANULOCYTES NFR BLD AUTO: 0.3 %
KETONES URINE: NEGATIVE
LDLC SERPL CALC-MCNC: 109 MG/DL
LEUKOCYTE ESTERASE URINE: ABNORMAL
LYMPHOCYTES # BLD AUTO: 1.92 K/UL
LYMPHOCYTES NFR BLD AUTO: 30.9 %
MAN DIFF?: NORMAL
MCHC RBC-ENTMCNC: 29.9 PG
MCHC RBC-ENTMCNC: 32.8 GM/DL
MCV RBC AUTO: 91.3 FL
MICROSCOPIC-UA: NORMAL
MONOCYTES # BLD AUTO: 0.58 K/UL
MONOCYTES NFR BLD AUTO: 9.3 %
NEUTROPHILS # BLD AUTO: 3.61 K/UL
NEUTROPHILS NFR BLD AUTO: 58 %
NITRITE URINE: NEGATIVE
NONHDLC SERPL-MCNC: 136 MG/DL
PH URINE: 6.5
PLATELET # BLD AUTO: 157 K/UL
POTASSIUM SERPL-SCNC: 3.9 MMOL/L
PROT SERPL-MCNC: 7.1 G/DL
PROTEIN URINE: NEGATIVE
RBC # BLD: 4.38 M/UL
RBC # FLD: 12.1 %
RED BLOOD CELLS URINE: 1 /HPF
SODIUM SERPL-SCNC: 141 MMOL/L
SPECIFIC GRAVITY URINE: 1.01
SQUAMOUS EPITHELIAL CELLS: 3 /HPF
TRIGL SERPL-MCNC: 132 MG/DL
TSH SERPL-ACNC: 1 UIU/ML
UROBILINOGEN URINE: NORMAL
VIT B12 SERPL-MCNC: 461 PG/ML
WBC # FLD AUTO: 6.22 K/UL
WHITE BLOOD CELLS URINE: 6 /HPF

## 2021-07-23 ENCOUNTER — TRANSCRIPTION ENCOUNTER (OUTPATIENT)
Age: 41
End: 2021-07-23

## 2021-07-26 ENCOUNTER — TRANSCRIPTION ENCOUNTER (OUTPATIENT)
Age: 41
End: 2021-07-26

## 2021-07-28 ENCOUNTER — APPOINTMENT (OUTPATIENT)
Dept: ULTRASOUND IMAGING | Facility: HOSPITAL | Age: 41
End: 2021-07-28
Payer: COMMERCIAL

## 2021-08-02 ENCOUNTER — TRANSCRIPTION ENCOUNTER (OUTPATIENT)
Age: 41
End: 2021-08-02

## 2021-08-03 ENCOUNTER — TRANSCRIPTION ENCOUNTER (OUTPATIENT)
Age: 41
End: 2021-08-03

## 2021-08-04 ENCOUNTER — TRANSCRIPTION ENCOUNTER (OUTPATIENT)
Age: 41
End: 2021-08-04

## 2021-08-06 ENCOUNTER — APPOINTMENT (OUTPATIENT)
Dept: ULTRASOUND IMAGING | Facility: HOSPITAL | Age: 41
End: 2021-08-06

## 2021-08-06 ENCOUNTER — OUTPATIENT (OUTPATIENT)
Dept: OUTPATIENT SERVICES | Facility: HOSPITAL | Age: 41
LOS: 1 days | End: 2021-08-06
Payer: COMMERCIAL

## 2021-08-06 DIAGNOSIS — E04.1 NONTOXIC SINGLE THYROID NODULE: ICD-10-CM

## 2021-08-06 PROCEDURE — 76536 US EXAM OF HEAD AND NECK: CPT | Mod: 26

## 2021-08-06 PROCEDURE — 76536 US EXAM OF HEAD AND NECK: CPT

## 2021-08-10 ENCOUNTER — APPOINTMENT (OUTPATIENT)
Dept: OTOLARYNGOLOGY | Facility: CLINIC | Age: 41
End: 2021-08-10
Payer: COMMERCIAL

## 2021-08-10 VITALS — TEMPERATURE: 97.2 F

## 2021-08-10 VITALS
HEART RATE: 99 BPM | SYSTOLIC BLOOD PRESSURE: 137 MMHG | HEIGHT: 60 IN | WEIGHT: 165 LBS | BODY MASS INDEX: 32.39 KG/M2 | DIASTOLIC BLOOD PRESSURE: 95 MMHG

## 2021-08-10 PROCEDURE — 99214 OFFICE O/P EST MOD 30 MIN: CPT

## 2021-08-10 NOTE — HISTORY OF PRESENT ILLNESS
[de-identified] : This is a patient referred by Dr King for thyroid nodule. This was found 5 months ago during physical exam/US.\par No dysphagia, dysphonia or dyspnea.\par Patient denies h/o radiation and has no family h/o thyroid cancer.\par US from [facility] showed a 9 x 5 x 9 mm complex cystic and solid thyroid nodule US from 7/13/18 showed a 9 x 5 x 6 mm nodule.\par 6/2019 US. showed stable nodule up to 0.9cm nodule\par Complete review of systems which was performed during a previous encounter was reviewed with the patient and there are no changes except as stated in the HPI section.\par

## 2021-08-10 NOTE — CONSULT LETTER
[Dear  ___] : Dear  [unfilled], [Courtesy Letter:] : I had the pleasure of seeing your patient, [unfilled], in my office today. [Please see my note below.] : Please see my note below. [Consult Closing:] : Thank you very much for allowing me to participate in the care of this patient.  If you have any questions, please do not hesitate to contact me. [Sincerely,] : Sincerely, [FreeTextEntry2] : Dr Julia Nolasco [FreeTextEntry3] : \par Darien Pompa MD, FACS\par \par Otolaryngology-Head and Neck Surgery\par [employer] [facility]\par

## 2021-08-13 ENCOUNTER — TRANSCRIPTION ENCOUNTER (OUTPATIENT)
Age: 41
End: 2021-08-13

## 2021-08-26 ENCOUNTER — APPOINTMENT (OUTPATIENT)
Dept: CARDIOLOGY | Facility: CLINIC | Age: 41
End: 2021-08-26
Payer: COMMERCIAL

## 2021-08-26 PROCEDURE — 93015 CV STRESS TEST SUPVJ I&R: CPT

## 2021-08-26 PROCEDURE — 93306 TTE W/DOPPLER COMPLETE: CPT

## 2021-09-13 ENCOUNTER — APPOINTMENT (OUTPATIENT)
Dept: NEUROLOGY | Facility: CLINIC | Age: 41
End: 2021-09-13

## 2021-09-25 ENCOUNTER — TRANSCRIPTION ENCOUNTER (OUTPATIENT)
Age: 41
End: 2021-09-25

## 2021-10-05 ENCOUNTER — APPOINTMENT (OUTPATIENT)
Dept: PULMONOLOGY | Facility: CLINIC | Age: 41
End: 2021-10-05
Payer: COMMERCIAL

## 2021-10-05 VITALS
TEMPERATURE: 97 F | BODY MASS INDEX: 32.39 KG/M2 | WEIGHT: 165 LBS | RESPIRATION RATE: 16 BRPM | SYSTOLIC BLOOD PRESSURE: 120 MMHG | HEART RATE: 92 BPM | HEIGHT: 60 IN | DIASTOLIC BLOOD PRESSURE: 90 MMHG | OXYGEN SATURATION: 98 %

## 2021-10-05 PROCEDURE — 99214 OFFICE O/P EST MOD 30 MIN: CPT

## 2021-10-05 RX ORDER — ALBUTEROL SULFATE 90 UG/1
108 (90 BASE) INHALANT RESPIRATORY (INHALATION)
Qty: 1 | Refills: 1 | Status: ACTIVE | COMMUNITY
Start: 2021-10-05 | End: 1900-01-01

## 2021-10-05 NOTE — PROCEDURE
[FreeTextEntry1] : CXR in office; Read by Dr. Lopez. \par Impression: Normal appearing heart. No infiltrates, effusion or opacities noted.

## 2021-10-05 NOTE — HISTORY OF PRESENT ILLNESS
[TextBox_4] : Ms. PURVIS is a 41 year old female with a history of chronic interstitial cystitis, allergies, asthma, sinus arrhythmia, thyroid nodule, and low vitamin D, who presents into the office today for an acute care pulmonary evaluation. \par \par Her chief concern is experiencing intermittent, generalized chest tightness that does cause restriction in breathing. \par \par She states it is a "weird pain in my chest that is completely random".  She notes it does not worsen with palpation to area or movement.  She states she has been trying to figure out what triggers the chest tightness to occur. She notes is can happen while at rest or post meals.  She notes episodes last a few hours and will resolve on their own after that.  She tries to avoid using her Albuterol inhaler, but has used it once per month and she feels slight benefit from it. \par \par She notes she is not on maintenance inhaler therapy at this time. \par She notes her  tells her it's all in her head. \par She has been working from home and since the pandemic she has not been exercising. \par \par She is s/p cardiac work up with Dr. Orlando.  She notes she had evaluation because of one episode of nighttime palpitations.  She had an EKG, Echo and Stress test which all resulted WNL. \par \par She denies fever/chills, increased fatigue, decreased appetite, cough, SOB @ rest or exertion, wheezing, heartburn/indigestion, dizziness, headaches or any other issues.  \par \par \par

## 2021-10-05 NOTE — ASSESSMENT
[FreeTextEntry1] : Ms. PURVIS is a 41 year old female with a history of chronic interstitial cystitis, allergies, asthma, sinus arrhythmia, thyroid nodule, and low vitamin D, who presents into the office today for an acute care pulmonary evaluation. \par Her chief concern is experiencing intermittent, generalized chest tightness that does cause restriction in breathing.\par \par 1.  Asthma:\par - Add Symbicort (160) 2 inhalations BID (use for at least 2 weeks - rinse and gargle post use). \par - Add ProAir rescue inhaler 2 inhalations before exercise.\par \par Patient to follow up with Dr. Lopez in 4 months w/ PFTs.\par Patient to call with further questions and concerns.\par Patient verbalizes understanding of care plan and is agreeable.\par

## 2021-10-05 NOTE — REVIEW OF SYSTEMS
[Chest Tightness] : chest tightness [Anxiety] : anxiety [Negative] : Endocrine [Cough] : no cough [Sputum] : no sputum [Dyspnea] : no dyspnea [Wheezing] : no wheezing [SOB on Exertion] : no sob on exertion

## 2021-10-05 NOTE — REASON FOR VISIT
Vitals  Signs   Recorded: 04Sep2018 01:54PM   Blood Pressure: 108 / 68  Heart Rate: 55  Height: 5 ft 3 in  Weight: 214 lb 6.40 oz  BMI Calculated: 37.98  BSA Calculated: 1.99    Reason For Visit  Advocate Reason For Visit:   Patient presents for follow up for heart failure, cardiomyopathy, dyslipidemia, and hypertension. Advocate Chaperone RFV: Accompanied By: alone. :  services not used. Advocate Referred By RFV:   Referred By:   Dr. Lynnette Corona ([facility])      Presenting Meds   1. RaNITidine HCl - 150 MG Oral Tablet; TAKE 1 TABLET BY MOUTH TWICE A DAY; Therapy: 52VZG6107 to (Evaluate:28Mar2019)  Requested for: 02Apr2018; Last   Rx:[id number] Ordered   2. Coenzyme Q10 100 MG Oral Tablet; TAKE 1 TABLET EVERY 8 HOURS; Therapy: 37GFS2347 to (Evaluate:18Jun2017)  Requested for: 28AWW8248; Last   Rx:[id number] Ordered   3. Entresto  MG Oral Tablet; TAKE 1 TABLET BY MOUTH TWICE A DAY; Therapy: 64LEZ7887 to (Emy Stern)  Requested for: 17AZN3639; Last   Rx:[id number] Ordered   4. Metoprolol Succinate  MG Oral Tablet Extended Release 24 Hour; TAKE 1 TABLET   BY MOUTH EVERY DAY; Therapy: 42IQP3923 to (Sia Vallecillo)  Requested for: 61Rxj0611; Last   Rx:33Clj0080 Ordered   5. Spironolactone 25 MG Oral Tablet; TAKE 1 TABLET in the morning; Therapy: 44ZMA3868 to (Evaluate:12Jun2018)  Requested for: 96MUA9059; Last   Rx:[id number]; Status: ACTIVE - Transmit to Pharmacy - Awaiting Verification Ordered   6. BuPROPion HCl ER (SR) 200 MG Oral Tablet Extended Release 12 Hour; TAKE 1   TABLET DAILY; Therapy: 78BAQ1602 to (Evaluate:13Ifg9240)  Requested for: 16BXO7652; Last   Rx:[id number] Ordered   7. MetFORMIN HCl - 500 MG Oral Tablet; TAKE 1 TABLET TWICE DAILY; Therapy: 87VTX6873 to (Samson Brito)  Requested for: 61MXA1083; Last   Rx:[id number]; Status: ACTIVE - Transmit to [address] Verification Ordered   8.  Atorvastatin Calcium 80 MG Oral Tablet; TAKE 1 TABLET BY MOUTH EVERY DAY; Therapy: 53VPX8535 to (Evaluate:67Qyt4620)  Requested for: 84Fdr3584; Last   Rx:05Uoa7770 Ordered   9. Zolpidem Tartrate 10 MG Oral Tablet; TAKE 1 TABLET BY MOUTH EVERY NIGHT AT   BEDTIME; Therapy: 54QNT9007 to (Evaluate:25Fmx5114); Last Rx:14Hzh5616 Ordered   10. Lancets; test twice daily; Therapy: 66HHE3666 to ([facility])  Requested for: 58SCE0259; Last    Rx:[id number] Ordered   11. Aspirin 81 MG Oral Tablet Delayed Release; TAKE 1 TABLET DAILY; Therapy: 15UDP6598 to ([facility])  Requested for: 45WUI8322; Last    Rx:28Jgy5583 Ordered   12. BuPROPion HCl ER (SR) 150 MG Oral Tablet Extended Release 12 Hour; TAKE ONE    TABLET BY MOUTH EVERYDAY; Therapy: 73YIF8645 to (Evaluate:17Cmv8426)  Requested for: 47JVU6495; Last    Rx:[id number] Ordered   13. Centrum Oral Tablet; Therapy: (Recorded:44Kao2785) to Recorded   14. CVS Melatonin 5 MG Oral Capsule; TAKE 1 TABLET BY MOUTH AT NIGHT; Therapy: 80Avp2291 to (Evaluate:61Ocs4029)  Requested for: 13Zlh3349; Last    Rx:30Zel1086 Ordered   15. TRUEtest Test In Vitro Strip; USE TWO TIMES A DAY TO CHECK BLOOD SUGAR; Therapy: 62KKF4434 to [id number])  Requested for: 43FYO0596; Last    Rx:56Peo2627 Ordered    Quality  Quality:   Adult Wellness CI not using alcohol, no tobacco use, has feelings of hopelessness (PHQ-2), taking medication for depression, taking aspirin, discussion of risks and benefits of Aspirin and offending medication withdrawn because of a contraindication - aspirin. History of Present Illness  Advocate Free Text HPI:   Ms. Jenny Diaz is a 79year-old woman with a history of sleep apnea, fibromyalgia, anxiety disorder, obesity, heart failure with reduced ejection fraction, nonischemic cardiomyopathy s/p ICD, nonobstructive coronary artery disease, HTN, and COPD. In May of 2016, Ms. Sushila Brown underwent implantation of an implantable cardioverter defibrillator (ICD) for primary prevention of sudden cardiac death. Since she was last seen in the EP clinic, Ms. Ginger Colon reports that she has been feeling OK from a cardiovascular standpoint. She denies experiencing any recent lightheadedness, dizziness, or syncope. She denies chest pain. She denies PND, orthopnea, or leg swelling. She denies stroke/TIA symptoms. She takes care of her household duties without encountering any fatigue or dyspnea. She has been feeling anxious and depressed lately. She is worried about her mother who is sick in [place]. She denies homicidal or suicidal ideation. Review of Systems  Advocate Complete (Adult):   Systemic: no fever and no chills. Eyes: no significant vision changes. Cardiovascular: no palpitations, no lightheadedness and no lower extremity edema. Pulmonary: no chronic cough, no orthopnea and no PND. Gastrointestinal: no melena, no nausea, no vomiting and no abdominal pain. Genitourinary: no hematuria. Musculoskeletal: no diffuse joint pain. Neurological: no headache, no dizziness and no fainting. Psychiatric: anhedonia and depression, but no feelings of hopelessness and not suicidal.   Integumentary and Breasts: no rashes. Past Medical History   1. History of Arteriosclerotic coronary artery disease (I25.10)   2. History of Cardiomyopathy, nonischemic (I42.8)   3. History of DJD (degenerative joint disease) (M19.90)   4. History of Heart failure with reduced ejection fraction (I50.20)   5. History of Heart failure with reduced ejection fraction (I50.20)   6. History of cardiomyopathy (Z86.79)   7. History of chronic obstructive lung disease (Z87.09)   8. History of fibromyalgia (Z87.39)   9. History of hypertension (Z86.79)   10. History of sleep apnea (Z86.69)   11. History of ICD (implantable cardioverter-defibrillator) in place (Z95.810)   12. Obesity (E66.9)    Surgical History   1. History of Implantable Cardioverter-Defibrillator   2.  History of Tubal Ligation    Family History   1. Family history of Bipolar disorder : Child   2. Family history of cardiomyopathy (Z82.49) : Sister   3. Family history of diabetes mellitus (Z83.3) : Mother, Sister   4. Family history of essential hypertension (Z82.49) : Mother, Sister   5. Family history of sudden cardiac death (SCD) (Z80.43) : Brother    Social History   Â· Current every day smoker (F17.200)   Â· Diet is nutritious and balanced   Â· Former smoker (Z87.891)   Â· Lack of exercise (Z72.3)   Â·    Â· No alcohol use   Â· No drug use   Â· Rarely consumes alcohol (Z78.9)    Physical Exam    General Appearance   Not in acute distress. Head   No trauma, normocephalic. Neck   No elevated JVP. Eyes   Conjunctivae not injected, no xanthelasma. Ears, Nose, Throat:   Mucosa pink and moist.    Lungs   Full expansion and clear to auscultation    Cardiovascular Regular rhythm. No rubs. Examination of extremities for edema and/or varicosities: No peripheral edema. Abdomen Soft, nontender, nondistended. Assessment   1. Heart failure with reduced ejection fraction (I50.20)   2. Cardiomyopathy (I42.9)   3. ICD (implantable cardioverter-defibrillator) in place (I96.139)    Discussion/Summary  Advocate DS: IMPRESSION/RECOMMENDATIONS:    1. Heart Failure with reduced EF (AHA Stage C, NYHA Class I-II, nonischemic cardiomyopathy): No recent symptoms of decompensated heart failure. Ms. Kalani Cantor is mostly NYHA Class I with occasional Class II symptoms. She is euvolemic on exam today. She may continue on medical therapy for cardiomyopathy and heart failure. She should follow up with her cardiologist, Dr. Sheri Martinez. Ms. Kalani Cantor was advised to weigh herself daily. 2. ICD, in situ: Ms. Kalani Cantor underwent ICD implantation in May of 2016 for primary prevention of sudden cardiac death. Her ICD has been functioning OK. Her next device check should be in 3 months.      Follow up in 1 year      Message  text templates:   Dear Dr. Flako Lopez ([facility]),    I had the pleasure of seeing our mutual patient Jenny Diaz today and my office note is below for your records. Please do not hesitate to contact me if you have any questions or concerns about the note below. Thank you for the opportunity to share in the care of this patient.      Signatures   Electronically signed by : Jeana Cabello M.D.; Sep  4 2018  2:08PM CST [Asthma] : asthma [Acute] : an acute visit

## 2021-10-06 ENCOUNTER — TRANSCRIPTION ENCOUNTER (OUTPATIENT)
Age: 41
End: 2021-10-06

## 2021-10-06 RX ORDER — BUDESONIDE AND FORMOTEROL FUMARATE DIHYDRATE 160; 4.5 UG/1; UG/1
160-4.5 AEROSOL RESPIRATORY (INHALATION) TWICE DAILY
Qty: 1 | Refills: 2 | Status: DISCONTINUED | COMMUNITY
Start: 2021-10-05 | End: 2021-10-06

## 2021-10-18 ENCOUNTER — TRANSCRIPTION ENCOUNTER (OUTPATIENT)
Age: 41
End: 2021-10-18

## 2021-10-18 RX ORDER — FLUTICASONE PROPIONATE AND SALMETEROL 250; 50 UG/1; UG/1
250-50 POWDER RESPIRATORY (INHALATION)
Qty: 1 | Refills: 1 | Status: DISCONTINUED | COMMUNITY
Start: 2021-10-06 | End: 2021-10-18

## 2021-10-20 ENCOUNTER — TRANSCRIPTION ENCOUNTER (OUTPATIENT)
Age: 41
End: 2021-10-20

## 2021-10-22 ENCOUNTER — TRANSCRIPTION ENCOUNTER (OUTPATIENT)
Age: 41
End: 2021-10-22

## 2021-10-25 ENCOUNTER — TRANSCRIPTION ENCOUNTER (OUTPATIENT)
Age: 41
End: 2021-10-25

## 2021-11-02 ENCOUNTER — NON-APPOINTMENT (OUTPATIENT)
Age: 41
End: 2021-11-02

## 2021-11-03 ENCOUNTER — TRANSCRIPTION ENCOUNTER (OUTPATIENT)
Age: 41
End: 2021-11-03

## 2021-11-04 ENCOUNTER — APPOINTMENT (OUTPATIENT)
Dept: PULMONOLOGY | Facility: CLINIC | Age: 41
End: 2021-11-04
Payer: COMMERCIAL

## 2021-11-04 ENCOUNTER — TRANSCRIPTION ENCOUNTER (OUTPATIENT)
Age: 41
End: 2021-11-04

## 2021-11-04 VITALS
SYSTOLIC BLOOD PRESSURE: 140 MMHG | BODY MASS INDEX: 32.39 KG/M2 | DIASTOLIC BLOOD PRESSURE: 90 MMHG | TEMPERATURE: 97.3 F | OXYGEN SATURATION: 98 % | HEIGHT: 60 IN | RESPIRATION RATE: 16 BRPM | HEART RATE: 108 BPM | WEIGHT: 165 LBS

## 2021-11-04 DIAGNOSIS — R05.9 COUGH, UNSPECIFIED: ICD-10-CM

## 2021-11-04 PROCEDURE — 99214 OFFICE O/P EST MOD 30 MIN: CPT

## 2021-11-04 NOTE — HISTORY OF PRESENT ILLNESS
[TextBox_4] : Ms. PURVIS is a 41 year old female presenting to the office  for follow up pulmonary evaluation. Her chief complaint is\par - She saw the NP a couple weeks ago \par - She notes she every so often gets tightness and trouble breathing.\par - She notes she was was given Symbicort but she had some issues with her insurance so she has not received anything yet. \par - Tuesday morning she started feeling the similar feeling, and instead of subsiding she felt the symptoms all day and then worsened due to her anxiety. \par - she had taken her albuterol a couple of times throughout the day. \par - she was given a nebulizer by her friend but she has not taken it \par - she just feels like her chest is tight \par - she notes last night she started to get chest pains which started to scare her. shes not sure what's going on. \par - no fevers, chills, or sweats\par - shes not sure if its heartburn. She took a TUMs and shes not sure if it made it better. \par - she notes nothing really makes the chest symptoms better. \par - she notes now she has a gnawing pain in the chest. \par - she is not on any B/C \par - no recent traveling \par - she notes sleep is generally okay, she falls asleep fine but then she will wake up a couple times and she has trouble getting back to sleep. \par - has not been doing any lifting \par - she notes her breathing right now is okay, but she feels a little tightness. \par - she notes theres also a constant shooting pain inside the chest which has been there since yesterday. And every so ofte she will get a random shoot of pain. \par - over the summer she got a Cardiac Work up and everything was fine. \par - She  denies any visual issues, headaches, nausea, vomiting, fever, chills, sweats, chest pressure, diarrhea, constipation, dysphagia, myalgia, dizziness, leg swelling, leg pain, itchy eyes, itchy ears.

## 2021-11-04 NOTE — ASSESSMENT
[FreeTextEntry1] : Ms. PURVIS is a 41 year old female with a history of  chronic interstitial cystitis, allergies, asthma, sinus arrhythmia, thyroid nodule, and low vitamin D, who presents into the office today  for follow up pulmonary evaluation SOB - persistent chest pressure / tightness / SOB / shooting pain persistent x1 day \par \par \par The patient's shortness of breath is multifactorial due to:\par -pulmonary disease \par      - mild asthma \par -poor breathing mechanics \par -overweight/out of shape\par -?cardiac disease \par \par \par Problem 1: mild asthma (Flair, related to reflux) - (active) \par - Add Symbicort (160) 2 inhalations BID or equivalent \par -Add ProAir rescue inhaler 2 inhalations before exercise \par -Asthma is believed to be caused by inherited (genetic) and environmental factor, but its exact cause is unknown. Asthma may be triggered by allergens, lung infections, or irritants in the air. Asthma triggers are different for each person\par -Inhaler technique reviewed as well as oral hygiene techniques reviewed with patient. Avoidance of cold air, extremes of temperature, rescue inhaler should be used before exercise. Order of medication reviewed with patient. Recommended use of a cool mist humidifier in the bedroom.\par \par  \par Problem 2: GERD - ?Active \par -continue Pepcid 40 mg QHS \par -Rule of 2s: avoid eating too much, eating too fast, eating too late, eating too spicy, eating too lousy, eating two hours before bed.\par -Things to avoid including overeating, spicy foods, tight clothing, eating within three hours of bed, this list is not all inclusive. \par -For treatment of reflux, possible options discussed including diet control, H2 blockers, PPIs, as well as coating motility agents discussed as treatment options. Timing of meals and proximity of last meal to sleep were discussed. If symptoms persist, a formal gastrointestinal evaluation is needed.\par \par Problem 2a: Chest Pain - likely ?GERD / ?Pericarditis\par - blood test: ESR, CRP, D-Dimer, \par - Cardiac (Dr. Liao 10/2021) \par - get CTPA now \par \par Problem 3: Allergies\par -continue Flonase 1 sniff each nostril BID (PRN)\par -continue Claritin 10 mg QD (PRN)\par Environmental measures for allergies were encouraged including mattress and pillow cover, air purifier, and environmental controls. \par \par \par Problem 4: Low Vit D\par - on supplements \par Has been associated with asthma exacerbations and increased allergic symptoms. The goal based on recent information is maintaining levels between 50-70 and low normal is 30. Recommended 50,000 units every two weeks to once a month depending on the level.\par \par  \par Problem 5 : Poor Mechanics of Breathing\par - Proper breathing techniques were reviewed with an emphasis of exhalation. Patient instructed to breath in for 1 second and out for four seconds. Patient was encouraged to not talk while walking. \par \par Problem 6 : Out of shape \par -Weight loss, exercise, and diet control were discussed and are highly encouraged. Treatment options were given such as, aqua therapy, and contacting a nutritionist. Recommended to use the elliptical, stationary bike, less use of treadmill. \par \par Problem 7: Cardiac (doubtful)\par - follow up with cardiologist  -Chaya \par \par Problem 8 : Health Maintenance/COVID19 Precautions: s/p COVID0-19 vaccine Pfizer x2 \par - Clean your hands often. Wash your hands often with soap and water for at least 20 seconds, especially after blowing your nose, coughing, or sneezing, or having been in a public place.\par - If soap and water are not available, use a hand  that contains at least 60% alcohol.\par - To the extent possible, avoid touching high-touch surfaces in public places - elevator buttons, door handles, handrails, handshaking with people, etc. Use a tissue or your sleeve to cover your hand or finger if you must touch something.\par - Wash your hands after touching surfaces in public places.\par - Avoid touching your face, nose, eyes, etc.\par - Clean and disinfect your home to remove germs: practice routine cleaning of frequently touched surfaces (for example: tables, doorknobs, light switches, handles, desks, toilets, faucets, sinks & cell phones)\par - Avoid crowds, especially in poorly ventilated spaces. Your risk of exposure to respiratory viruses like COVID-19 may increase in crowded, closed-in settings with little air circulation if there are people in the crowd who are sick. All patients are recommended to practice social distancing and stay at least 6 feet away from others.\par - Avoid all non-essential travel including plane trips, and especially avoid embarking on cruise ships.\par -If COVID-19 is spreading in your community, take extra measures to put distance between yourself and other people to further reduce your risk of being exposed to this new virus.\par -Stay home as much as possible.\par - Consider ways of getting food brought to your house through family, social, or commercial networks\par -Be aware that the virus has been known to live in the air up to 3 hours post exposure, cardboard up to 24 hours post exposure, copper up to 4 hours post exposure, steel and plastic up to 2-3 days post exposure. Risk of transmission from these surfaces are affected by many variables.\par Immune Support Recommendations:\par -OTC Vitamin C 500mg BID \par -OTC Quercetin 250-500mg BID \par -OTC Zinc 75-100mg per day \par -OTC Melatonin 1 or 2 mg a night \par -OTC Vitamin D 1-4000mg per day \par -OTC Tonic Water 8oz per day\par Asthma and COVID19:\par You need to make sure your asthma is under control. This often requires the use of inhaled corticosteroids (and sometimes oral corticosteroids). Inhaled corticosteroids do not likely reduce your immune system’s ability to fight infections, but oral corticosteroids may. It is important to use the steps above to protect yourself to limit your exposure to any respiratory virus.\par \par Problem  9: health maintenance\par -s/p influenza vaccine\par -recommended strep pneumonia vaccines after age 65: Prevnar-13 vaccine, followed by Pneumo vaccine 23 one year following\par -recommended early intervention for URIs\par -recommended regular osteoporosis evaluations\par -recommended early dermatological evaluations\par -recommended after the age of 50 to the age of 70, colonoscopy every 5 years \par \par  Follow up in 6-8 weeks\par -he  is recommended to call with any changes, questions, or concerns.\par

## 2021-11-04 NOTE — ADDENDUM
[FreeTextEntry1] : Documented by Katja Webster acting as a scribe for Dr. Toribio Lopez on 11/04/2021 \par \par All medical record entries made by the Scribe were at my, Dr. Toribio Lopez's, direction and personally dictated by me on 11/04/2021 . I have reviewed the chart and agree that the record accurately reflects my personal performance of the history, physical exam, assessment and plan. I have also personally directed, reviewed, and agree with the discharge instructions.

## 2021-11-06 ENCOUNTER — EMERGENCY (EMERGENCY)
Facility: HOSPITAL | Age: 41
LOS: 1 days | Discharge: HOME | End: 2021-11-06
Attending: EMERGENCY MEDICINE | Admitting: EMERGENCY MEDICINE
Payer: COMMERCIAL

## 2021-11-06 VITALS
OXYGEN SATURATION: 100 % | WEIGHT: 160.06 LBS | HEART RATE: 89 BPM | DIASTOLIC BLOOD PRESSURE: 117 MMHG | HEIGHT: 60 IN | TEMPERATURE: 98 F | RESPIRATION RATE: 18 BRPM | SYSTOLIC BLOOD PRESSURE: 159 MMHG

## 2021-11-06 VITALS
DIASTOLIC BLOOD PRESSURE: 109 MMHG | SYSTOLIC BLOOD PRESSURE: 163 MMHG | TEMPERATURE: 98 F | OXYGEN SATURATION: 99 % | HEART RATE: 122 BPM | RESPIRATION RATE: 17 BRPM

## 2021-11-06 DIAGNOSIS — Z90.89 ACQUIRED ABSENCE OF OTHER ORGANS: Chronic | ICD-10-CM

## 2021-11-06 LAB
ALBUMIN SERPL ELPH-MCNC: 3.7 G/DL — SIGNIFICANT CHANGE UP (ref 3.3–5)
ALP SERPL-CCNC: 36 U/L — LOW (ref 40–120)
ALT FLD-CCNC: 24 U/L — SIGNIFICANT CHANGE UP (ref 12–78)
ANION GAP SERPL CALC-SCNC: 4 MMOL/L — LOW (ref 5–17)
AST SERPL-CCNC: 15 U/L — SIGNIFICANT CHANGE UP (ref 15–37)
BASOPHILS # BLD AUTO: 0.01 K/UL — SIGNIFICANT CHANGE UP (ref 0–0.2)
BASOPHILS NFR BLD AUTO: 0.2 % — SIGNIFICANT CHANGE UP (ref 0–2)
BILIRUB SERPL-MCNC: 0.5 MG/DL — SIGNIFICANT CHANGE UP (ref 0.2–1.2)
BUN SERPL-MCNC: 8 MG/DL — SIGNIFICANT CHANGE UP (ref 7–23)
CALCIUM SERPL-MCNC: 9.2 MG/DL — SIGNIFICANT CHANGE UP (ref 8.5–10.1)
CHLORIDE SERPL-SCNC: 110 MMOL/L — HIGH (ref 96–108)
CK SERPL-CCNC: 72 U/L — SIGNIFICANT CHANGE UP (ref 26–192)
CO2 SERPL-SCNC: 26 MMOL/L — SIGNIFICANT CHANGE UP (ref 22–31)
CREAT SERPL-MCNC: 0.57 MG/DL — SIGNIFICANT CHANGE UP (ref 0.5–1.3)
CRP SERPL HS-MCNC: 0.65 MG/L
D DIMER BLD IA.RAPID-MCNC: <150 NG/ML DDU — SIGNIFICANT CHANGE UP
DEPRECATED D DIMER PPP IA-ACNC: <150 NG/ML DDU
EOSINOPHIL # BLD AUTO: 0.05 K/UL — SIGNIFICANT CHANGE UP (ref 0–0.5)
EOSINOPHIL NFR BLD AUTO: 1 % — SIGNIFICANT CHANGE UP (ref 0–6)
ERYTHROCYTE [SEDIMENTATION RATE] IN BLOOD BY WESTERGREN METHOD: 7 MM/HR
GLUCOSE SERPL-MCNC: 84 MG/DL — SIGNIFICANT CHANGE UP (ref 70–99)
HCG SERPL-ACNC: <1 MIU/ML — SIGNIFICANT CHANGE UP
HCT VFR BLD CALC: 38.4 % — SIGNIFICANT CHANGE UP (ref 34.5–45)
HGB BLD-MCNC: 12.9 G/DL — SIGNIFICANT CHANGE UP (ref 11.5–15.5)
IMM GRANULOCYTES NFR BLD AUTO: 0.2 % — SIGNIFICANT CHANGE UP (ref 0–1.5)
LYMPHOCYTES # BLD AUTO: 1.93 K/UL — SIGNIFICANT CHANGE UP (ref 1–3.3)
LYMPHOCYTES # BLD AUTO: 39.8 % — SIGNIFICANT CHANGE UP (ref 13–44)
MCHC RBC-ENTMCNC: 29.9 PG — SIGNIFICANT CHANGE UP (ref 27–34)
MCHC RBC-ENTMCNC: 33.6 GM/DL — SIGNIFICANT CHANGE UP (ref 32–36)
MCV RBC AUTO: 89.1 FL — SIGNIFICANT CHANGE UP (ref 80–100)
MONOCYTES # BLD AUTO: 0.52 K/UL — SIGNIFICANT CHANGE UP (ref 0–0.9)
MONOCYTES NFR BLD AUTO: 10.7 % — SIGNIFICANT CHANGE UP (ref 2–14)
NEUTROPHILS # BLD AUTO: 2.33 K/UL — SIGNIFICANT CHANGE UP (ref 1.8–7.4)
NEUTROPHILS NFR BLD AUTO: 48.1 % — SIGNIFICANT CHANGE UP (ref 43–77)
NRBC # BLD: 0 /100 WBCS — SIGNIFICANT CHANGE UP (ref 0–0)
NT-PROBNP SERPL-SCNC: 28 PG/ML — SIGNIFICANT CHANGE UP (ref 0–125)
PLATELET # BLD AUTO: 151 K/UL — SIGNIFICANT CHANGE UP (ref 150–400)
POTASSIUM SERPL-MCNC: 3.6 MMOL/L — SIGNIFICANT CHANGE UP (ref 3.5–5.3)
POTASSIUM SERPL-SCNC: 3.6 MMOL/L — SIGNIFICANT CHANGE UP (ref 3.5–5.3)
PROT SERPL-MCNC: 7.6 G/DL — SIGNIFICANT CHANGE UP (ref 6–8.3)
RAPID RVP RESULT: SIGNIFICANT CHANGE UP
RBC # BLD: 4.31 M/UL — SIGNIFICANT CHANGE UP (ref 3.8–5.2)
RBC # FLD: 11.8 % — SIGNIFICANT CHANGE UP (ref 10.3–14.5)
SARS-COV-2 RNA SPEC QL NAA+PROBE: SIGNIFICANT CHANGE UP
SODIUM SERPL-SCNC: 140 MMOL/L — SIGNIFICANT CHANGE UP (ref 135–145)
TROPONIN I, HIGH SENSITIVITY RESULT: 6.7 NG/L — SIGNIFICANT CHANGE UP
WBC # BLD: 4.85 K/UL — SIGNIFICANT CHANGE UP (ref 3.8–10.5)
WBC # FLD AUTO: 4.85 K/UL — SIGNIFICANT CHANGE UP (ref 3.8–10.5)

## 2021-11-06 PROCEDURE — 93005 ELECTROCARDIOGRAM TRACING: CPT

## 2021-11-06 PROCEDURE — G1004: CPT

## 2021-11-06 PROCEDURE — 36415 COLL VENOUS BLD VENIPUNCTURE: CPT

## 2021-11-06 PROCEDURE — 93010 ELECTROCARDIOGRAM REPORT: CPT

## 2021-11-06 PROCEDURE — 80053 COMPREHEN METABOLIC PANEL: CPT

## 2021-11-06 PROCEDURE — 71046 X-RAY EXAM CHEST 2 VIEWS: CPT | Mod: 26

## 2021-11-06 PROCEDURE — 99284 EMERGENCY DEPT VISIT MOD MDM: CPT | Mod: 25

## 2021-11-06 PROCEDURE — 0225U NFCT DS DNA&RNA 21 SARSCOV2: CPT

## 2021-11-06 PROCEDURE — 83880 ASSAY OF NATRIURETIC PEPTIDE: CPT

## 2021-11-06 PROCEDURE — 96374 THER/PROPH/DIAG INJ IV PUSH: CPT | Mod: XU

## 2021-11-06 PROCEDURE — 71275 CT ANGIOGRAPHY CHEST: CPT | Mod: 26,MG

## 2021-11-06 PROCEDURE — 84702 CHORIONIC GONADOTROPIN TEST: CPT

## 2021-11-06 PROCEDURE — 82550 ASSAY OF CK (CPK): CPT

## 2021-11-06 PROCEDURE — 85379 FIBRIN DEGRADATION QUANT: CPT

## 2021-11-06 PROCEDURE — 94640 AIRWAY INHALATION TREATMENT: CPT

## 2021-11-06 PROCEDURE — 99285 EMERGENCY DEPT VISIT HI MDM: CPT | Mod: CS

## 2021-11-06 PROCEDURE — 71046 X-RAY EXAM CHEST 2 VIEWS: CPT

## 2021-11-06 PROCEDURE — 84484 ASSAY OF TROPONIN QUANT: CPT

## 2021-11-06 PROCEDURE — 85025 COMPLETE CBC W/AUTO DIFF WBC: CPT

## 2021-11-06 PROCEDURE — 71275 CT ANGIOGRAPHY CHEST: CPT | Mod: MG

## 2021-11-06 RX ORDER — SODIUM CHLORIDE 9 MG/ML
1000 INJECTION INTRAMUSCULAR; INTRAVENOUS; SUBCUTANEOUS
Refills: 0 | Status: COMPLETED | OUTPATIENT
Start: 2021-11-06 | End: 2021-11-06

## 2021-11-06 RX ORDER — IPRATROPIUM/ALBUTEROL SULFATE 18-103MCG
3 AEROSOL WITH ADAPTER (GRAM) INHALATION ONCE
Refills: 0 | Status: COMPLETED | OUTPATIENT
Start: 2021-11-06 | End: 2021-11-06

## 2021-11-06 RX ADMIN — SODIUM CHLORIDE 1000 MILLILITER(S): 9 INJECTION INTRAMUSCULAR; INTRAVENOUS; SUBCUTANEOUS at 11:34

## 2021-11-06 RX ADMIN — Medication 3 MILLILITER(S): at 11:35

## 2021-11-06 RX ADMIN — Medication 125 MILLIGRAM(S): at 11:35

## 2021-11-06 RX ADMIN — SODIUM CHLORIDE 1000 MILLILITER(S): 9 INJECTION INTRAMUSCULAR; INTRAVENOUS; SUBCUTANEOUS at 09:51

## 2021-11-06 NOTE — ED ADULT NURSE NOTE - OBJECTIVE STATEMENT
pt with complaints of shortness of breath and chest pain and pressure since Tuesday. pt denies cough or fever. pt saw pulmonologist who ordered ct scan. pt using rescue inhaler without relief

## 2021-11-06 NOTE — ED PROVIDER NOTE - NSFOLLOWUPINSTRUCTIONS_ED_ALL_ED_FT
1)  Follow-up with your Primary Medical Doctor. Call monday for prompt follow-up.  2) Follow-up with Cardiology as discussed. Call monday for prompt follow-up and further workup and evaluation.  3) Return to Emergency room for any worsening or persistent pain, shortness of breath, weakness, fever, abdominal pain, dizziness, passing out, unexplained pain in arms, legs, back, any vomiting, feeling like your heart is racing,  or any other concerning symptoms.  4) See attached instruction sheets for additional information, including information regarding signs and symptoms to look out for, reasons to seek immediate care and other important instructions.  5) Albuterol as needed  6) Prednisone once daily for 4 more days

## 2021-11-06 NOTE — ED ADULT NURSE NOTE - MUSCULOSKELETAL ASSESSMENT
Hpi Title: Evaluation of Skin Lesions
Have Your Spot(S) Been Treated In The Past?: has not been treated
- - -

## 2021-11-06 NOTE — ED PROVIDER NOTE - PROGRESS NOTE DETAILS
At length discussion with patient regarding nature of the patient's symptoms. Discussed results of all diagnostic testing in ED. Discussed chest pain, Shortness of breath, Dizziness, syncope /  near syncope precautions and instructions. Patient demonstrates understanding that a cardiac cause of the symptoms has not been totally excluded, but at this time there is no evidence to warrant an inpatient workup.  Discussed the importance of continued follow-up with Cardiology as outpatient to complete workup.   Discussed asthma / uri concerns as well. Will give neb / steroids now, outpt fu with pmd / cardio. Pt much improved, wishes to go home now, will fu with pmd and cardio asap.

## 2021-11-06 NOTE — ED PROVIDER NOTE - PATIENT PORTAL LINK FT
You can access the FollowMyHealth Patient Portal offered by [facility] by registering at the following website: [email or web address]. By joining [employer]’s FollowMyHealth portal, you will also be able to view your health information using other applications (apps) compatible with our system.

## 2021-11-06 NOTE — ED PROVIDER NOTE - CARE PROVIDER_API CALL
Julia Nolasco ()  Internal Medicine  [address] [place] [address]  Phone: [phone]  Fax: [phone]  Follow Up Time:     Mathew Orlando)  Cardio [facility]  [address] [place] [address]  Phone: [phone]  Fax: [phone]  Follow Up Time:

## 2021-11-06 NOTE — ED PROVIDER NOTE - OBJECTIVE STATEMENT
42 yo F p/w co feeling SOB x past ~ 4 days, some heaviness in chest. No MOROCHO. no fever/chills. Min cough. pt states feels similar to asthma. Pt has seen pulm, was prescribed advair, hasn't yet gotten from pharmacy. Pt was supposed to get CT, hasn't yet set up as outpt. no abd pain. no n/v/d. no recent travel / immob. Pt states her child has URI now (a cold), she or child hasn't been tested during this illness. Pt is fully vaccinated. No suspect activity. no agg/allev factors. no other acute co or changes.

## 2021-11-06 NOTE — ED PROVIDER NOTE - CARE PROVIDERS DIRECT ADDRESSES
,[email or web address]

## 2021-11-07 NOTE — ED POST DISCHARGE NOTE - DETAILS
Hank pt - doing well, no acute co or changes. resent prescription to [facility] pharmacy

## 2021-11-08 ENCOUNTER — NON-APPOINTMENT (OUTPATIENT)
Age: 41
End: 2021-11-08

## 2021-11-08 ENCOUNTER — TRANSCRIPTION ENCOUNTER (OUTPATIENT)
Age: 41
End: 2021-11-08

## 2021-11-08 RX ORDER — FLUTICASONE PROPIONATE AND SALMETEROL 232; 14 UG/1; UG/1
232-14 POWDER, METERED RESPIRATORY (INHALATION) TWICE DAILY
Qty: 3 | Refills: 1 | Status: DISCONTINUED | COMMUNITY
Start: 2021-11-04 | End: 2021-11-08

## 2021-11-09 ENCOUNTER — TRANSCRIPTION ENCOUNTER (OUTPATIENT)
Age: 41
End: 2021-11-09

## 2021-11-09 ENCOUNTER — APPOINTMENT (OUTPATIENT)
Dept: PULMONOLOGY | Facility: CLINIC | Age: 41
End: 2021-11-09
Payer: COMMERCIAL

## 2021-11-09 DIAGNOSIS — E55.9 VITAMIN D DEFICIENCY, UNSPECIFIED: ICD-10-CM

## 2021-11-09 DIAGNOSIS — Z91.09 OTHER ALLERGY STATUS, OTHER THAN TO DRUGS AND BIOLOGICAL SUBSTANCES: ICD-10-CM

## 2021-11-09 PROCEDURE — 99442: CPT

## 2021-11-09 NOTE — PROCEDURE
[FreeTextEntry1] : CBC (11/6/2021) revealed WBC 4.85, HGB 12.9, HCT 38.4, , EOS 1%\par ESR was 7\par CMP revealed nml\par CRP cardiac .65\par BNP nml\par CPK nml\par RVP negative\par \par Echocardiogram (8/26/2021) revealed an ejection fraction of 68*

## 2021-11-09 NOTE — ASSESSMENT
[FreeTextEntry1] : Ms. PURVIS is a 41 year old female with a history of  chronic interstitial cystitis, allergies, asthma, sinus arrhythmia, thyroid nodule, and low vitamin D, who presents into the office today via phone call for follow up pulmonary evaluation SOB - persistent chest pressure / tightness / SOB / shooting pain persistent despite 4 days of prednisone, ?panic attacks\par \par \par The patient's shortness of breath is multifactorial due to:\par -pulmonary disease \par      - mild asthma \par -poor breathing mechanics \par -overweight/out of shape\par -?cardiac disease - HTN "labile", ?HR\par -?endocrine issue - thyroid / carcinoid\par -?panic attacks\par \par \par Problem 1: mild asthma (Flair, related to reflux) - (active) \par -Stop prednisone\par - Add Symbicort (160) 2 inhalations BID or equivalent \par -Add ProAir rescue inhaler 2 inhalations before exercise \par -Asthma is believed to be caused by inherited (genetic) and environmental factor, but its exact cause is unknown. Asthma may be triggered by allergens, lung infections, or irritants in the air. Asthma triggers are different for each person\par -Inhaler technique reviewed as well as oral hygiene techniques reviewed with patient. Avoidance of cold air, extremes of temperature, rescue inhaler should be used before exercise. Order of medication reviewed with patient. Recommended use of a cool mist humidifier in the bedroom.\par \par  \par Problem 2: GERD - ?Active \par -continue Pepcid 40 mg QHS \par -Rule of 2s: avoid eating too much, eating too fast, eating too late, eating too spicy, eating too lousy, eating two hours before bed.\par -Things to avoid including overeating, spicy foods, tight clothing, eating within three hours of bed, this list is not all inclusive. \par -For treatment of reflux, possible options discussed including diet control, H2 blockers, PPIs, as well as coating motility agents discussed as treatment options. Timing of meals and proximity of last meal to sleep were discussed. If symptoms persist, a formal gastrointestinal evaluation is needed.\par \par Problem 2a: Chest Pain - likely ?BP or HR issue, ?endo issue\par - blood test: ESR, CRP, D-Dimer, \par - Cardiac (Dr. Liao 10/2021)  - follow-up BP\par -Complete urine 24 hr metanephrines\par \par Problem 2B: Endo - ?thyroid ?pheo\par -Recommend an endo evaluation (Live et al.)\par \par Problem 3: Allergies\par -continue Flonase 1 sniff each nostril BID (PRN)\par -continue Claritin 10 mg QD (PRN)\par Environmental measures for allergies were encouraged including mattress and pillow cover, air purifier, and environmental controls. \par \par Problem 4: Low Vit D\par - on supplements \par Has been associated with asthma exacerbations and increased allergic symptoms. The goal based on recent information is maintaining levels between 50-70 and low normal is 30. Recommended 50,000 units every two weeks to once a month depending on the level.\par \par  \par Problem 5 : Poor Mechanics of Breathing\par - Proper breathing techniques were reviewed with an emphasis of exhalation. Patient instructed to breath in for 1 second and out for four seconds. Patient was encouraged to not talk while walking. \par \par Problem 6 : Out of shape \par -Weight loss, exercise, and diet control were discussed and are highly encouraged. Treatment options were given such as, aqua therapy, and contacting a nutritionist. Recommended to use the elliptical, stationary bike, less use of treadmill. \par \par Problem 7: Cardiac (doubtful)\par - follow up with cardiologist  -Chaya \par \par Problem 8 : Health Maintenance/COVID19 Precautions: s/p COVID0-19 vaccine Pfizer x2 \par - Clean your hands often. Wash your hands often with soap and water for at least 20 seconds, especially after blowing your nose, coughing, or sneezing, or having been in a public place.\par - If soap and water are not available, use a hand  that contains at least 60% alcohol.\par - To the extent possible, avoid touching high-touch surfaces in public places - elevator buttons, door handles, handrails, handshaking with people, etc. Use a tissue or your sleeve to cover your hand or finger if you must touch something.\par - Wash your hands after touching surfaces in public places.\par - Avoid touching your face, nose, eyes, etc.\par - Clean and disinfect your home to remove germs: practice routine cleaning of frequently touched surfaces (for example: tables, doorknobs, light switches, handles, desks, toilets, faucets, sinks & cell phones)\par - Avoid crowds, especially in poorly ventilated spaces. Your risk of exposure to respiratory viruses like COVID-19 may increase in crowded, closed-in settings with little air circulation if there are people in the crowd who are sick. All patients are recommended to practice social distancing and stay at least 6 feet away from others.\par - Avoid all non-essential travel including plane trips, and especially avoid embarking on cruise ships.\par -If COVID-19 is spreading in your community, take extra measures to put distance between yourself and other people to further reduce your risk of being exposed to this new virus.\par -Stay home as much as possible.\par - Consider ways of getting food brought to your house through family, social, or commercial networks\par -Be aware that the virus has been known to live in the air up to 3 hours post exposure, cardboard up to 24 hours post exposure, copper up to 4 hours post exposure, steel and plastic up to 2-3 days post exposure. Risk of transmission from these surfaces are affected by many variables.\par Immune Support Recommendations:\par -OTC Vitamin C 500mg BID \par -OTC Quercetin 250-500mg BID \par -OTC Zinc 75-100mg per day \par -OTC Melatonin 1 or 2 mg a night \par -OTC Vitamin D 1-4000mg per day \par -OTC Tonic Water 8oz per day\par Asthma and COVID19:\par You need to make sure your asthma is under control. This often requires the use of inhaled corticosteroids (and sometimes oral corticosteroids). Inhaled corticosteroids do not likely reduce your immune system’s ability to fight infections, but oral corticosteroids may. It is important to use the steps above to protect yourself to limit your exposure to any respiratory virus.\par \par Problem  9: health maintenance\par -s/p influenza vaccine\par -recommended strep pneumonia vaccines after age 65: Prevnar-13 vaccine, followed by Pneumo vaccine 23 one year following\par -recommended early intervention for URIs\par -recommended regular osteoporosis evaluations\par -recommended early dermatological evaluations\par -recommended after the age of 50 to the age of 70, colonoscopy every 5 years \par \par  Follow up in 6-8 weeks\par -he  is recommended to call with any changes, questions, or concerns.\par

## 2021-11-09 NOTE — HISTORY OF PRESENT ILLNESS
[Home] : at home, [unfilled] , at the time of the visit. [Medical Office: ([facility])___] : at the medical office located in  [Verbal consent obtained from patient] : the patient, [unfilled] [TextBox_4] : Ms. PURVIS is a 41 year old female presenting to the office for follow up pulmonary evaluation via phone call. Her chief complaint is\par -s/p ER 10/6/2021 and is currently on prednisone\par -she notes besides having blood pressure, she seemed fine\par -she notes she was given a nebulizer and IV prednisone and 50 mg for the next 4 days\par -she notes she is still not feeling well\par -she notes feeling fine one time and then she gets chest tightness, trouble breathing, dizzy spells, and pain in her right arm, head, and back\par -she notes trouble sleeping last night\par -she notes her mouth is extremely dry\par -she notes she has a thyroid nodule that she checks every year\par -she notes her cardiologist is Dr. Mcrae\par -she notes seeing a head and neck specialist for the thyroid nodule, not an endocrinologist\par \par patient denies any headaches, nausea, vomiting, fever, chills, sweats, chest pain, chest pressure, palpitations, coughing, wheezing, fatigue, diarrhea, constipation, dysphagia, leg swelling, leg pain, itchy eyes, itchy ears, heartburn, reflux or sour taste in the mouth

## 2021-11-10 ENCOUNTER — APPOINTMENT (OUTPATIENT)
Dept: PULMONOLOGY | Facility: CLINIC | Age: 41
End: 2021-11-10
Payer: COMMERCIAL

## 2021-11-10 ENCOUNTER — APPOINTMENT (OUTPATIENT)
Dept: CARDIOLOGY | Facility: CLINIC | Age: 41
End: 2021-11-10

## 2021-11-10 DIAGNOSIS — R06.02 SHORTNESS OF BREATH: ICD-10-CM

## 2021-11-10 LAB
CORTIS SERPL-MCNC: 9 UG/DL
T3FREE SERPL-MCNC: 2.19 PG/ML
T4 FREE SERPL-MCNC: 1.2 NG/DL
TSH SERPL-ACNC: 0.71 UIU/ML

## 2021-11-10 PROCEDURE — 95012 NITRIC OXIDE EXP GAS DETER: CPT

## 2021-11-10 PROCEDURE — 94010 BREATHING CAPACITY TEST: CPT

## 2021-11-10 PROCEDURE — 94729 DIFFUSING CAPACITY: CPT

## 2021-11-10 PROCEDURE — 94727 GAS DIL/WSHOT DETER LNG VOL: CPT

## 2021-11-10 PROCEDURE — 94618 PULMONARY STRESS TESTING: CPT

## 2021-11-11 ENCOUNTER — NON-APPOINTMENT (OUTPATIENT)
Age: 41
End: 2021-11-11

## 2021-11-11 ENCOUNTER — TRANSCRIPTION ENCOUNTER (OUTPATIENT)
Age: 41
End: 2021-11-11

## 2021-11-12 ENCOUNTER — TRANSCRIPTION ENCOUNTER (OUTPATIENT)
Age: 41
End: 2021-11-12

## 2021-11-12 ENCOUNTER — NON-APPOINTMENT (OUTPATIENT)
Age: 41
End: 2021-11-12

## 2021-11-15 ENCOUNTER — TRANSCRIPTION ENCOUNTER (OUTPATIENT)
Age: 41
End: 2021-11-15

## 2021-11-15 LAB
CREAT UR-MCNC: 61.3 MG/DL
METANEPHS 24H UR-MCNC: 36 UG/L
METANEPHS/CREAT UR: 0.2
NORMETANEPHRINE 24H UR-MCNC: 51 UG/L

## 2021-11-16 ENCOUNTER — NON-APPOINTMENT (OUTPATIENT)
Age: 41
End: 2021-11-16

## 2021-11-16 ENCOUNTER — APPOINTMENT (OUTPATIENT)
Dept: CARDIOLOGY | Facility: CLINIC | Age: 41
End: 2021-11-16
Payer: COMMERCIAL

## 2021-11-16 VITALS
HEIGHT: 60 IN | DIASTOLIC BLOOD PRESSURE: 103 MMHG | HEART RATE: 95 BPM | OXYGEN SATURATION: 100 % | BODY MASS INDEX: 30.63 KG/M2 | WEIGHT: 156 LBS | SYSTOLIC BLOOD PRESSURE: 147 MMHG

## 2021-11-16 VITALS — SYSTOLIC BLOOD PRESSURE: 140 MMHG | DIASTOLIC BLOOD PRESSURE: 90 MMHG

## 2021-11-16 LAB
CORTICOSTEROID BIND GLOBULIN: 2.1 MG/DL
CORTIS SERPL-MCNC: 8.3 UG/DL
CORTISOL, FREE: 0.43 UG/DL
PFCX: 5.2 %

## 2021-11-16 PROCEDURE — 99214 OFFICE O/P EST MOD 30 MIN: CPT

## 2021-11-16 PROCEDURE — 93000 ELECTROCARDIOGRAM COMPLETE: CPT

## 2021-11-17 ENCOUNTER — APPOINTMENT (OUTPATIENT)
Dept: INTERNAL MEDICINE | Facility: CLINIC | Age: 41
End: 2021-11-17

## 2021-11-18 ENCOUNTER — NON-APPOINTMENT (OUTPATIENT)
Age: 41
End: 2021-11-18

## 2021-11-18 ENCOUNTER — TRANSCRIPTION ENCOUNTER (OUTPATIENT)
Age: 41
End: 2021-11-18

## 2021-11-19 ENCOUNTER — TRANSCRIPTION ENCOUNTER (OUTPATIENT)
Age: 41
End: 2021-11-19

## 2021-11-23 ENCOUNTER — APPOINTMENT (OUTPATIENT)
Dept: INTERNAL MEDICINE | Facility: CLINIC | Age: 41
End: 2021-11-23
Payer: COMMERCIAL

## 2021-11-23 VITALS
OXYGEN SATURATION: 98 % | HEART RATE: 86 BPM | TEMPERATURE: 97.3 F | WEIGHT: 157 LBS | BODY MASS INDEX: 30.82 KG/M2 | HEIGHT: 60 IN | RESPIRATION RATE: 14 BRPM | SYSTOLIC BLOOD PRESSURE: 130 MMHG | DIASTOLIC BLOOD PRESSURE: 80 MMHG

## 2021-11-23 DIAGNOSIS — R35.0 FREQUENCY OF MICTURITION: ICD-10-CM

## 2021-11-23 LAB
APPEARANCE: CLEAR
BACTERIA: NEGATIVE
BILIRUBIN URINE: NEGATIVE
BLOOD URINE: NEGATIVE
COLOR: COLORLESS
GLUCOSE QUALITATIVE U: NEGATIVE
HYALINE CASTS: 1 /LPF
KETONES URINE: NEGATIVE
LEUKOCYTE ESTERASE URINE: NEGATIVE
MICROSCOPIC-UA: NORMAL
NITRITE URINE: NEGATIVE
PH URINE: 7
PROTEIN URINE: NEGATIVE
RED BLOOD CELLS URINE: 1 /HPF
SPECIFIC GRAVITY URINE: 1.01
SQUAMOUS EPITHELIAL CELLS: 1 /HPF
UROBILINOGEN URINE: NORMAL
WHITE BLOOD CELLS URINE: 1 /HPF

## 2021-11-23 PROCEDURE — 99213 OFFICE O/P EST LOW 20 MIN: CPT

## 2021-11-23 NOTE — REVIEW OF SYSTEMS
[Dysuria] : no dysuria [Nocturia] : no nocturia [Frequency] : frequency [Vaginal Discharge] : no vaginal discharge [Negative] : Heme/Lymph

## 2021-11-23 NOTE — HISTORY OF PRESENT ILLNESS
[de-identified] : Pt here today for follow up.\par Pt started on Lexapro for anxiety\par Pt is feeling better and breathing better. \par Maybe some urinary frequency

## 2021-11-27 ENCOUNTER — TRANSCRIPTION ENCOUNTER (OUTPATIENT)
Age: 41
End: 2021-11-27

## 2021-11-27 LAB — BACTERIA UR CULT: NORMAL

## 2021-11-29 ENCOUNTER — TRANSCRIPTION ENCOUNTER (OUTPATIENT)
Age: 41
End: 2021-11-29

## 2021-12-02 ENCOUNTER — APPOINTMENT (OUTPATIENT)
Dept: NEUROLOGY | Facility: CLINIC | Age: 41
End: 2021-12-02
Payer: COMMERCIAL

## 2021-12-02 ENCOUNTER — APPOINTMENT (OUTPATIENT)
Dept: CARDIOLOGY | Facility: CLINIC | Age: 41
End: 2021-12-02

## 2021-12-02 DIAGNOSIS — R42 DIZZINESS AND GIDDINESS: ICD-10-CM

## 2021-12-02 DIAGNOSIS — F41.0 PANIC DISORDER [EPISODIC PAROXYSMAL ANXIETY]: ICD-10-CM

## 2021-12-02 PROCEDURE — 99215 OFFICE O/P EST HI 40 MIN: CPT | Mod: 95

## 2021-12-03 PROBLEM — R42 DIZZINESS: Status: ACTIVE | Noted: 2019-06-05

## 2021-12-03 PROBLEM — F41.0 PANIC ATTACK: Status: ACTIVE | Noted: 2021-12-03

## 2021-12-03 NOTE — ASSESSMENT
[FreeTextEntry1] : Panic  with right sided throbbing headache cw headache with migranous features (same as prior) and numbness/ tingling resolved with Lexapro cw panic attack.  Will continue to monitor for symptoms and patient will call if symptoms reoccur.  She will cw counseling for family issues, they have been stressful to patient.\par \par Dizziness, resolved. The patient has been advised to stay well hydrated.\par \par History of febrile seizure as a baby, asymptomatic since then. \par \par I spent the time noted on the day of this patient encounter preparing for, providing and documenting the above E/M service and counseling and educate patient on differential, workup, disease course, and treatment/management. Education was provided to the patient during this encounter. All questions and concerns were answered and addressed in detail. The patient verbalized understanding and agreed to plan. Patient was advised to continue to monitor for neurologic symptoms and to notify my office or go to the nearest emergency room if there are any changes. Any orders/referrals and communications were provided as well. \par Side effects of the above medications were discussed in detail including but not limited to applicable black box warning and teratogenicity as appropriate. \par Patient was advised to bring previous records to my office, including CD of imaging, when applicable. \par \par

## 2021-12-03 NOTE — PHYSICAL EXAM
[General Appearance - Alert] : alert [General Appearance - In No Acute Distress] : in no acute distress [Person] : oriented to person [Place] : oriented to place [Time] : oriented to time [Registration Intact] : recent registration memory intact [Concentration Intact] : normal concentrating ability [Naming Objects] : no difficulty naming common objects [Fluency] : fluency intact [Comprehension] : comprehension intact [Vocabulary] : adequate range of vocabulary [Cranial Nerves Facial (VII)] : face symmetrical

## 2021-12-03 NOTE — HISTORY OF PRESENT ILLNESS
[FreeTextEntry1] : The patient is here for evaluation of dizziness described as lightheadedness occurred a few months ago. The patient was feeling lightheaded and had blurry position, without loss of consciousness, resolved. There was no double vision or loss of vision. No difficulty with balance or walking. No difficulty speaking or swallowing. No vertigo. No focal weakness or sensory loss. The patient does not drink enough water. She does not remember what she was doing when she had the dizziness.  Since last visit, the dizziness resolved.\par \par She had a febrile seizures as a baby. She has not had meningitis. No loss of consciousness or tonic-clonic or seizures since then. No known history of seizures after a febrile seizure as a child. \par \par Since last visit, the patient had 2 episodes of going to the ED for chest pain, racing heart and SOB/  She also had whole body tension, numbness tingling and right sided throbbing headache without migranous features (similar to her prior headache).   All tests are negative. She was just started on Lexapro.  Symptoms resolved and have not reoccurred thus far.  Patient is seeing psychologist for family issues.

## 2021-12-08 ENCOUNTER — TRANSCRIPTION ENCOUNTER (OUTPATIENT)
Age: 41
End: 2021-12-08

## 2021-12-08 ENCOUNTER — APPOINTMENT (OUTPATIENT)
Dept: INTERNAL MEDICINE | Facility: CLINIC | Age: 41
End: 2021-12-08
Payer: COMMERCIAL

## 2021-12-08 PROCEDURE — 99213 OFFICE O/P EST LOW 20 MIN: CPT | Mod: 95

## 2021-12-08 NOTE — ASSESSMENT
----- Message from Carley Cummings sent at 4/8/2020 11:16 AM CDT -----  Pt is requesting a call back to speak with Nurse about her medication gabapentin     (NEURONTIN) 100 MG capsule being denied and would like it to be the original prescription     Not the new updated dosage because her insurance did not approve it     Please call and advise          Thank you   [FreeTextEntry1] : anxiety- doing well with lexapro 5mg and CBT.  Renew meds and recheck in 3 months.\par Time spent on chart and phone 15 minutes.

## 2021-12-08 NOTE — HISTORY OF PRESENT ILLNESS
[de-identified] : Pt presents for follow up.\par Pt started on lexapro 5 mg and feels much better.\par Pt no longer SOB or with CP or gi symptoms\par Doing well on current meds [Home] : at home, [unfilled] , at the time of the visit. [Medical Office: ([facility])___] : at the medical office located in  [Verbal consent obtained from patient] : the patient, [unfilled]

## 2021-12-14 ENCOUNTER — TRANSCRIPTION ENCOUNTER (OUTPATIENT)
Age: 41
End: 2021-12-14

## 2021-12-30 ENCOUNTER — APPOINTMENT (OUTPATIENT)
Dept: CARDIOLOGY | Facility: CLINIC | Age: 41
End: 2021-12-30
Payer: COMMERCIAL

## 2021-12-30 ENCOUNTER — NON-APPOINTMENT (OUTPATIENT)
Age: 41
End: 2021-12-30

## 2021-12-30 VITALS — SYSTOLIC BLOOD PRESSURE: 146 MMHG | DIASTOLIC BLOOD PRESSURE: 90 MMHG

## 2021-12-30 VITALS
DIASTOLIC BLOOD PRESSURE: 98 MMHG | SYSTOLIC BLOOD PRESSURE: 159 MMHG | HEIGHT: 60 IN | WEIGHT: 167 LBS | HEART RATE: 75 BPM | OXYGEN SATURATION: 98 % | BODY MASS INDEX: 32.79 KG/M2

## 2021-12-30 PROCEDURE — 93000 ELECTROCARDIOGRAM COMPLETE: CPT

## 2021-12-30 PROCEDURE — 99213 OFFICE O/P EST LOW 20 MIN: CPT

## 2021-12-30 NOTE — HISTORY OF PRESENT ILLNESS
[FreeTextEntry1] : This is a 41 year old woman with a history of obesity, anxiety, asthma who presents to the office for a cardiac evaluation.   I last evaluated her in November when whe was having panic attacks, with chest pain, back pain, numbness and tingling of her extremities, throbbing in her neck, racing heart.  She went to see her pulmonologist, and been evaluated in the  ED.  All tests are negative.  She was started on Lexapro, and her symptoms are much better.  She has had her BP checked at outside office visits, and it is normal.   j

## 2021-12-30 NOTE — DISCUSSION/SUMMARY
[FreeTextEntry1] : This is a 41 year old woman with a history of obesity, anxiety, asthma who presents to the office for a cardiac evaluation.  Her panic attacks are improved on Lexapro.  She will continue this.  Her BP is controlled off of medications when checked at outside office visits.  She will follow in six months.  We discussed the benefits of a healthy diet, regular exercise and physical activity, and weight loss in detail.

## 2022-01-03 ENCOUNTER — TRANSCRIPTION ENCOUNTER (OUTPATIENT)
Age: 42
End: 2022-01-03

## 2022-01-14 ENCOUNTER — RX RENEWAL (OUTPATIENT)
Age: 42
End: 2022-01-14

## 2022-01-19 ENCOUNTER — TRANSCRIPTION ENCOUNTER (OUTPATIENT)
Age: 42
End: 2022-01-19

## 2022-01-19 ENCOUNTER — APPOINTMENT (OUTPATIENT)
Dept: NEUROLOGY | Facility: CLINIC | Age: 42
End: 2022-01-19

## 2022-01-20 ENCOUNTER — TRANSCRIPTION ENCOUNTER (OUTPATIENT)
Age: 42
End: 2022-01-20

## 2022-02-10 ENCOUNTER — APPOINTMENT (OUTPATIENT)
Dept: ENDOCRINOLOGY | Facility: CLINIC | Age: 42
End: 2022-02-10

## 2022-02-10 ENCOUNTER — TRANSCRIPTION ENCOUNTER (OUTPATIENT)
Age: 42
End: 2022-02-10

## 2022-02-14 ENCOUNTER — APPOINTMENT (OUTPATIENT)
Dept: GASTROENTEROLOGY | Facility: CLINIC | Age: 42
End: 2022-02-14
Payer: COMMERCIAL

## 2022-02-14 ENCOUNTER — TRANSCRIPTION ENCOUNTER (OUTPATIENT)
Age: 42
End: 2022-02-14

## 2022-02-14 ENCOUNTER — RX RENEWAL (OUTPATIENT)
Age: 42
End: 2022-02-14

## 2022-02-14 VITALS — DIASTOLIC BLOOD PRESSURE: 104 MMHG | SYSTOLIC BLOOD PRESSURE: 185 MMHG

## 2022-02-14 VITALS — HEIGHT: 60 IN | BODY MASS INDEX: 32.39 KG/M2 | WEIGHT: 165 LBS

## 2022-02-14 VITALS — SYSTOLIC BLOOD PRESSURE: 160 MMHG | DIASTOLIC BLOOD PRESSURE: 100 MMHG

## 2022-02-14 DIAGNOSIS — J45.909 UNSPECIFIED ASTHMA, UNCOMPLICATED: ICD-10-CM

## 2022-02-14 DIAGNOSIS — R07.89 OTHER CHEST PAIN: ICD-10-CM

## 2022-02-14 DIAGNOSIS — K21.9 GASTRO-ESOPHAGEAL REFLUX DISEASE W/OUT ESOPHAGITIS: ICD-10-CM

## 2022-02-14 DIAGNOSIS — E04.1 NONTOXIC SINGLE THYROID NODULE: ICD-10-CM

## 2022-02-14 PROCEDURE — 99204 OFFICE O/P NEW MOD 45 MIN: CPT

## 2022-02-14 NOTE — HISTORY OF PRESENT ILLNESS
[de-identified] : 40 y/o mother of two with Hx of Asthma, HTN (diet controlled) who presents for possible reflux, GB disease. \par \par In 11/2021 started having difficulty breath - though due to Asthma, started having chest pain - went to ER advised to see cardiologist which she did and also follow up with pulmonary doctor.  \par \par She recall couldn't catch breath, had back pain, ribs hurt thought she was having a heart attack.  She says she was having panic attacks - started on Lexapro - occasionally gets attacks every now then.  \par \par She says her pulmonologist told her she may need to see GI. \par She says she is wondering if she has a gallbladder issue because her husbands family members complained of not being able to breath and ultimately had to have GB removed. \par \par She continues to have difficulty breathing  but better.  No longer with chest pain. \par  she recalls not being able to eat - lost 10 lbs -now she has gained back the weight since feeling better. \par \par Says does not have heartburn - when she was having trouble breathing in 11/2021 she did have heartburn - felt a "little burning pain" in chest when she was having difficulty breathing - a few times a week - did try Pepcid once a night - did not help her breathing or the burning she was experiencing - last use of acid reducer was in 12/2021. \par \par She recalls being nauseas for a few days - attributes to possible Lexapro - but resolved.  \par \par Patient denies having abdominal pain, melena and hematochezia.\par \par She says she had such a nervous stomach - afraid of dying - she says she was experiencing irregular bowel movements - constipation then diarrhea.  She says her bowel habits have also improved.  \par \par Never had an upper endoscopy.  Never had a colonoscopy. \par Never had an abdominal ultrasound. \par Lately she has been having headaches - thinks she may have a brain tumor.  She says she worries about her general health.  She has contacted her neurologist.  \par \par Has interstitial cystitis - feels a bladder pressure. \par Underwent LEEP procedure. \par  \par All other review of systems are negative.

## 2022-02-14 NOTE — ASSESSMENT
[FreeTextEntry1] : IMPRESSION: \par #  Possible reflux \par -  Difficulty breathing, atypical chest pain - better now (Lexapro started in 12/2021)\par -  Heartburn at time of difficulty breathing - better - tried Pepcid initially - last use in 12/2021 \par -  Never had an upper endoscopy\par \par #  Concerned about Gallbladder \par -   family reported to her that difficulty breathing was a symptom of why they eventually needed to have GB removed. \par -  Never had an abdominal ultrasound \par \par #  Irregular bowel movements in 11/2021 - resolved \par -  Never had a colonoscopy \par \par #  Anxiety - worried about her general health - hasn’t had a panic attack while on Lexapro, seeing therapist \par \par \par \par PLAN:\par Patient is clinically getting better - discussed diagnostic tests to r/o acid reflux such as routine endoscopy with esophageal bx, upper endoscopy with BRAVO, 24 hour PH impedance test etc. We also discussed trial of acid reducer - she is getting better and would like to hold off for now, she will contact our office if symptom.s\par \par Abdominal ultrasound \par \par Discussed a colonoscopy - risks reviewed - she would like to hold off for now - she will contact our office if symptoms recur.\par \par Advised to contact PCP or go to urgent care regarding her elevated BP.  \par Continue with mental health support.

## 2022-02-14 NOTE — PHYSICAL EXAM
[General Appearance - Alert] : alert [General Appearance - Well Nourished] : well nourished [General Appearance - In No Acute Distress] : in no acute distress [General Appearance - Well Developed] : well developed [Sclera] : the sclera and conjunctiva were normal [Extraocular Movements] : extraocular movements were intact [Outer Ear] : the ears and nose were normal in appearance [Hearing Threshold Finger Rub Not [place]] : hearing was normal [Both Tympanic Membranes Were Examined] : both tympanic membranes were normal [Neck Appearance] : the appearance of the neck was normal [Neck Cervical Mass (___cm)] : no neck mass was observed [Auscultation Breath Sounds / Voice Sounds] : lungs were clear to auscultation bilaterally [Heart Rate And Rhythm] : heart rate was normal and rhythm regular [Heart Sounds] : normal S1 and S2 [Heart Sounds Gallop] : no gallops [Murmurs] : no murmurs [Heart Sounds Pericardial Friction Rub] : no pericardial rub [Bowel Sounds] : normal bowel sounds [Abdomen Tenderness] : non-tender [Abdomen Soft] : soft [] : no hepato-splenomegaly [Abdomen Mass (___ Cm)] : no abdominal mass palpated [Cervical Lymph Nodes Enlarged Posterior Bilaterally] : posterior cervical [Cervical Lymph Nodes Enlarged Anterior Bilaterally] : anterior cervical [Supraclavicular Lymph Nodes Enlarged Bilaterally] : supraclavicular [Abnormal Walk] : normal gait [Nail Clubbing] : no clubbing  or cyanosis of the fingernails [Oriented To Time, Place, And Person] : oriented to person, place, and time [Impaired Insight] : insight and judgment were intact [Affect] : the affect was normal

## 2022-02-15 ENCOUNTER — TRANSCRIPTION ENCOUNTER (OUTPATIENT)
Age: 42
End: 2022-02-15

## 2022-02-16 ENCOUNTER — APPOINTMENT (OUTPATIENT)
Dept: ULTRASOUND IMAGING | Facility: HOSPITAL | Age: 42
End: 2022-02-16
Payer: COMMERCIAL

## 2022-02-16 ENCOUNTER — OUTPATIENT (OUTPATIENT)
Dept: OUTPATIENT SERVICES | Facility: HOSPITAL | Age: 42
LOS: 1 days | End: 2022-02-16
Payer: COMMERCIAL

## 2022-02-16 DIAGNOSIS — Z90.89 ACQUIRED ABSENCE OF OTHER ORGANS: Chronic | ICD-10-CM

## 2022-02-16 DIAGNOSIS — R07.89 OTHER CHEST PAIN: ICD-10-CM

## 2022-02-16 PROCEDURE — 76700 US EXAM ABDOM COMPLETE: CPT

## 2022-02-16 PROCEDURE — 76700 US EXAM ABDOM COMPLETE: CPT | Mod: 26

## 2022-02-17 ENCOUNTER — TRANSCRIPTION ENCOUNTER (OUTPATIENT)
Age: 42
End: 2022-02-17

## 2022-02-17 DIAGNOSIS — R51.9 HEADACHE, UNSPECIFIED: ICD-10-CM

## 2022-03-02 ENCOUNTER — NON-APPOINTMENT (OUTPATIENT)
Age: 42
End: 2022-03-02

## 2022-03-02 ENCOUNTER — TRANSCRIPTION ENCOUNTER (OUTPATIENT)
Age: 42
End: 2022-03-02

## 2022-03-04 NOTE — ED ADULT TRIAGE NOTE - RESPIRATORY RATE (BREATHS/MIN)
Admitting Diagnosis: h/o endometrial hyperplasia  Discharge Diagnosis: s/p [facility]   Procedure: [facility]   Service: OB-GYN, Caitlyn Feng   Consults: none   Disposition: home  Condition as Discharge: Stable   Hospital Course: Patient was transferred to outpatient surgery after her procedure.  Her recovery was uncomplicated and by post-op day 0 she was tolerating PO without N/V, ambulating without difficulty, her pain was well controlled with PO pain medications and she had passed flatus. She was stable and ready for discharge.   Medications: OTC ibuprofen  Follow up: in 2 week in clinic   Instructions:  continue ambulation, take medications as needed and take stool softener as needed, pelvic rest until instructed otherwise by physician  Call or return to ED for fever >100.4, foul smelling vaginal discharge, heavy vaginal bleeding, abdominal pain not responsive to medications or other concerns.     
18

## 2022-03-10 ENCOUNTER — TRANSCRIPTION ENCOUNTER (OUTPATIENT)
Age: 42
End: 2022-03-10

## 2022-03-17 ENCOUNTER — NON-APPOINTMENT (OUTPATIENT)
Age: 42
End: 2022-03-17

## 2022-03-17 ENCOUNTER — TRANSCRIPTION ENCOUNTER (OUTPATIENT)
Age: 42
End: 2022-03-17

## 2022-03-18 ENCOUNTER — APPOINTMENT (OUTPATIENT)
Dept: PULMONOLOGY | Facility: CLINIC | Age: 42
End: 2022-03-18

## 2022-03-23 ENCOUNTER — TRANSCRIPTION ENCOUNTER (OUTPATIENT)
Age: 42
End: 2022-03-23

## 2022-03-24 ENCOUNTER — TRANSCRIPTION ENCOUNTER (OUTPATIENT)
Age: 42
End: 2022-03-24

## 2022-03-24 NOTE — ED PROVIDER NOTE - CARDIAC, MLM
no chest pain, no cough, and no shortness of breath.
Normal rate, regular rhythm.  Heart sounds S1, S2.  No murmurs, rubs or gallops.

## 2022-03-31 ENCOUNTER — APPOINTMENT (OUTPATIENT)
Dept: PULMONOLOGY | Facility: CLINIC | Age: 42
End: 2022-03-31

## 2022-04-05 ENCOUNTER — APPOINTMENT (OUTPATIENT)
Dept: INTERNAL MEDICINE | Facility: CLINIC | Age: 42
End: 2022-04-05
Payer: COMMERCIAL

## 2022-04-05 PROCEDURE — 99213 OFFICE O/P EST LOW 20 MIN: CPT | Mod: 95

## 2022-04-05 NOTE — ASSESSMENT
[FreeTextEntry1] : Anxiety- continue meds\par HTN- monitor blood pressure at home and recheck at cardiology office\par stressed diet and exercise\par Time spent 15 minutes on phone and charting.

## 2022-04-05 NOTE — HISTORY OF PRESENT ILLNESS
[de-identified] : Pt here today for follow up.\par Pt wants to discuss Lexapro\par Pt symptoms are much better.\par Pt less SOB and less anxious.\par Pt SC to follow up with cardiology for high blood pressure done at GI office. [Home] : at home, [unfilled] , at the time of the visit. [Medical Office: ([facility])___] : at the medical office located in  [Verbal consent obtained from patient] : the patient, [unfilled]

## 2022-08-01 ENCOUNTER — TRANSCRIPTION ENCOUNTER (OUTPATIENT)
Age: 42
End: 2022-08-01

## 2022-08-03 ENCOUNTER — TRANSCRIPTION ENCOUNTER (OUTPATIENT)
Age: 42
End: 2022-08-03

## 2022-08-09 ENCOUNTER — APPOINTMENT (OUTPATIENT)
Dept: CARDIOLOGY | Facility: CLINIC | Age: 42
End: 2022-08-09

## 2022-08-09 ENCOUNTER — NON-APPOINTMENT (OUTPATIENT)
Age: 42
End: 2022-08-09

## 2022-08-09 VITALS
BODY MASS INDEX: 31.41 KG/M2 | HEIGHT: 60 IN | SYSTOLIC BLOOD PRESSURE: 155 MMHG | HEART RATE: 111 BPM | OXYGEN SATURATION: 100 % | DIASTOLIC BLOOD PRESSURE: 118 MMHG | WEIGHT: 160 LBS

## 2022-08-09 PROCEDURE — 99214 OFFICE O/P EST MOD 30 MIN: CPT | Mod: 25

## 2022-08-09 PROCEDURE — 93000 ELECTROCARDIOGRAM COMPLETE: CPT

## 2022-08-09 PROCEDURE — 93790 AMBL BP MNTR W/SW I&R: CPT

## 2022-08-10 ENCOUNTER — APPOINTMENT (OUTPATIENT)
Dept: CARDIOLOGY | Facility: CLINIC | Age: 42
End: 2022-08-10

## 2022-08-17 ENCOUNTER — APPOINTMENT (OUTPATIENT)
Dept: CARDIOLOGY | Facility: CLINIC | Age: 42
End: 2022-08-17

## 2022-08-17 PROCEDURE — 93306 TTE W/DOPPLER COMPLETE: CPT

## 2022-09-06 ENCOUNTER — NON-APPOINTMENT (OUTPATIENT)
Age: 42
End: 2022-09-06

## 2022-09-06 ENCOUNTER — TRANSCRIPTION ENCOUNTER (OUTPATIENT)
Age: 42
End: 2022-09-06

## 2022-09-09 ENCOUNTER — APPOINTMENT (OUTPATIENT)
Dept: ULTRASOUND IMAGING | Facility: HOSPITAL | Age: 42
End: 2022-09-09

## 2022-09-09 ENCOUNTER — OUTPATIENT (OUTPATIENT)
Dept: OUTPATIENT SERVICES | Facility: HOSPITAL | Age: 42
LOS: 1 days | End: 2022-09-09
Payer: COMMERCIAL

## 2022-09-09 DIAGNOSIS — Z90.89 ACQUIRED ABSENCE OF OTHER ORGANS: Chronic | ICD-10-CM

## 2022-09-09 DIAGNOSIS — E04.1 NONTOXIC SINGLE THYROID NODULE: ICD-10-CM

## 2022-09-09 PROCEDURE — 76536 US EXAM OF HEAD AND NECK: CPT

## 2022-09-09 PROCEDURE — 76536 US EXAM OF HEAD AND NECK: CPT | Mod: 26

## 2022-09-13 ENCOUNTER — APPOINTMENT (OUTPATIENT)
Dept: INTERNAL MEDICINE | Facility: CLINIC | Age: 42
End: 2022-09-13

## 2022-09-13 PROCEDURE — 99213 OFFICE O/P EST LOW 20 MIN: CPT | Mod: 95

## 2022-09-13 NOTE — ASSESSMENT
[FreeTextEntry1] : anxiety- [place] [facility] and [place]ro daily\par Thyroid nodules- await repeat thyroid sono\par Time spent 20 minutes on phone and charting.

## 2022-09-13 NOTE — HISTORY OF PRESENT ILLNESS
[de-identified] : Pt with anxiety and wants see our therapist. \par Pt on Lexapro and wants more counseling [Home] : at home, [unfilled] , at the time of the visit. [Medical Office: ([facility])___] : at the medical office located in  [Verbal consent obtained from patient] : the patient, [unfilled]

## 2022-09-14 ENCOUNTER — NON-APPOINTMENT (OUTPATIENT)
Age: 42
End: 2022-09-14

## 2022-09-23 ENCOUNTER — NON-APPOINTMENT (OUTPATIENT)
Age: 42
End: 2022-09-23

## 2022-09-23 ENCOUNTER — TRANSCRIPTION ENCOUNTER (OUTPATIENT)
Age: 42
End: 2022-09-23

## 2022-09-26 ENCOUNTER — TRANSCRIPTION ENCOUNTER (OUTPATIENT)
Age: 42
End: 2022-09-26

## 2022-09-26 DIAGNOSIS — R09.82 POSTNASAL DRIP: ICD-10-CM

## 2022-09-26 RX ORDER — FLUTICASONE PROPIONATE 50 UG/1
50 SPRAY, METERED NASAL DAILY
Qty: 1 | Refills: 3 | Status: ACTIVE | COMMUNITY
Start: 2022-09-26 | End: 1900-01-01

## 2022-10-11 ENCOUNTER — TRANSCRIPTION ENCOUNTER (OUTPATIENT)
Age: 42
End: 2022-10-11

## 2022-10-29 ENCOUNTER — TRANSCRIPTION ENCOUNTER (OUTPATIENT)
Age: 42
End: 2022-10-29

## 2022-12-01 ENCOUNTER — TRANSCRIPTION ENCOUNTER (OUTPATIENT)
Age: 42
End: 2022-12-01

## 2022-12-15 ENCOUNTER — RX RENEWAL (OUTPATIENT)
Age: 42
End: 2022-12-15

## 2022-12-30 NOTE — ED ADULT NURSE NOTE - CHPI ED NUR DURATION
Patient requests all Lab, Cardiology, and Radiology Results on their Discharge Instructions Tuesday/day(s)

## 2023-01-17 DIAGNOSIS — Z23 ENCOUNTER FOR IMMUNIZATION: ICD-10-CM

## 2023-01-28 ENCOUNTER — TRANSCRIPTION ENCOUNTER (OUTPATIENT)
Age: 43
End: 2023-01-28

## 2023-03-03 ENCOUNTER — NON-APPOINTMENT (OUTPATIENT)
Age: 43
End: 2023-03-03

## 2023-03-05 ENCOUNTER — TRANSCRIPTION ENCOUNTER (OUTPATIENT)
Age: 43
End: 2023-03-05

## 2023-03-08 ENCOUNTER — TRANSCRIPTION ENCOUNTER (OUTPATIENT)
Age: 43
End: 2023-03-08

## 2023-03-09 ENCOUNTER — TRANSCRIPTION ENCOUNTER (OUTPATIENT)
Age: 43
End: 2023-03-09

## 2023-03-16 ENCOUNTER — NON-APPOINTMENT (OUTPATIENT)
Age: 43
End: 2023-03-16

## 2023-03-27 ENCOUNTER — RX RENEWAL (OUTPATIENT)
Age: 43
End: 2023-03-27

## 2023-04-16 ENCOUNTER — TRANSCRIPTION ENCOUNTER (OUTPATIENT)
Age: 43
End: 2023-04-16

## 2023-04-26 ENCOUNTER — TRANSCRIPTION ENCOUNTER (OUTPATIENT)
Age: 43
End: 2023-04-26

## 2023-05-05 ENCOUNTER — TRANSCRIPTION ENCOUNTER (OUTPATIENT)
Age: 43
End: 2023-05-05

## 2023-05-07 ENCOUNTER — RX RENEWAL (OUTPATIENT)
Age: 43
End: 2023-05-07

## 2023-05-08 ENCOUNTER — TRANSCRIPTION ENCOUNTER (OUTPATIENT)
Age: 43
End: 2023-05-08

## 2023-05-12 ENCOUNTER — TRANSCRIPTION ENCOUNTER (OUTPATIENT)
Age: 43
End: 2023-05-12

## 2023-06-20 ENCOUNTER — TRANSCRIPTION ENCOUNTER (OUTPATIENT)
Age: 43
End: 2023-06-20

## 2023-06-22 ENCOUNTER — TRANSCRIPTION ENCOUNTER (OUTPATIENT)
Age: 43
End: 2023-06-22

## 2023-07-07 ENCOUNTER — RX RENEWAL (OUTPATIENT)
Age: 43
End: 2023-07-07

## 2023-07-10 ENCOUNTER — NON-APPOINTMENT (OUTPATIENT)
Age: 43
End: 2023-07-10

## 2023-07-10 ENCOUNTER — APPOINTMENT (OUTPATIENT)
Dept: CARDIOLOGY | Facility: CLINIC | Age: 43
End: 2023-07-10
Payer: COMMERCIAL

## 2023-07-10 VITALS
HEIGHT: 60 IN | HEART RATE: 99 BPM | BODY MASS INDEX: 31.41 KG/M2 | SYSTOLIC BLOOD PRESSURE: 137 MMHG | DIASTOLIC BLOOD PRESSURE: 91 MMHG | OXYGEN SATURATION: 100 % | WEIGHT: 160 LBS

## 2023-07-10 PROCEDURE — 93000 ELECTROCARDIOGRAM COMPLETE: CPT

## 2023-07-10 PROCEDURE — 99214 OFFICE O/P EST MOD 30 MIN: CPT | Mod: 25

## 2023-07-10 RX ORDER — AMLODIPINE BESYLATE 10 MG/1
10 TABLET ORAL DAILY
Qty: 90 | Refills: 3 | Status: ACTIVE | COMMUNITY
Start: 2023-03-09 | End: 1900-01-01

## 2023-07-10 RX ORDER — BUDESONIDE AND FORMOTEROL FUMARATE DIHYDRATE 160; 4.5 UG/1; UG/1
160-4.5 AEROSOL RESPIRATORY (INHALATION) TWICE DAILY
Qty: 3 | Refills: 1 | Status: DISCONTINUED | COMMUNITY
Start: 2021-10-18 | End: 2023-07-10

## 2023-07-10 RX ORDER — ESCITALOPRAM OXALATE 5 MG/1
5 TABLET ORAL
Qty: 30 | Refills: 3 | Status: DISCONTINUED | COMMUNITY
Start: 2023-05-07 | End: 2023-07-10

## 2023-07-11 ENCOUNTER — TRANSCRIPTION ENCOUNTER (OUTPATIENT)
Age: 43
End: 2023-07-11

## 2023-07-13 ENCOUNTER — APPOINTMENT (OUTPATIENT)
Dept: INTERNAL MEDICINE | Facility: CLINIC | Age: 43
End: 2023-07-13

## 2023-09-08 ENCOUNTER — APPOINTMENT (OUTPATIENT)
Dept: INTERNAL MEDICINE | Facility: CLINIC | Age: 43
End: 2023-09-08
Payer: COMMERCIAL

## 2023-09-08 VITALS
HEIGHT: 60 IN | WEIGHT: 168 LBS | DIASTOLIC BLOOD PRESSURE: 84 MMHG | RESPIRATION RATE: 14 BRPM | TEMPERATURE: 97.9 F | OXYGEN SATURATION: 98 % | SYSTOLIC BLOOD PRESSURE: 136 MMHG | HEART RATE: 86 BPM | BODY MASS INDEX: 32.98 KG/M2

## 2023-09-08 DIAGNOSIS — F41.9 ANXIETY DISORDER, UNSPECIFIED: ICD-10-CM

## 2023-09-08 DIAGNOSIS — Z00.00 ENCOUNTER FOR GENERAL ADULT MEDICAL EXAMINATION W/OUT ABNORMAL FINDINGS: ICD-10-CM

## 2023-09-08 PROCEDURE — 99396 PREV VISIT EST AGE 40-64: CPT

## 2023-09-08 NOTE — HEALTH RISK ASSESSMENT
[Very Good] : ~his/her~  mood as very good [No falls in past year] : Patient reported no falls in the past year [0] : 2) Feeling down, depressed, or hopeless: Not at all (0) [Patient reported mammogram was normal] : Patient reported mammogram was normal [Patient reported PAP Smear was normal] : Patient reported PAP Smear was normal [MammogramDate] : 08/23 [PapSmearDate] : 08/23

## 2023-09-11 LAB
25(OH)D3 SERPL-MCNC: 25.2 NG/ML
ALBUMIN SERPL ELPH-MCNC: 4.5 G/DL
ALP BLD-CCNC: 47 U/L
ALT SERPL-CCNC: 24 U/L
ANION GAP SERPL CALC-SCNC: 11 MMOL/L
APPEARANCE: ABNORMAL
AST SERPL-CCNC: 23 U/L
BACTERIA: ABNORMAL /HPF
BILIRUB SERPL-MCNC: 0.3 MG/DL
BILIRUBIN URINE: NEGATIVE
BLOOD URINE: ABNORMAL
BUN SERPL-MCNC: 10 MG/DL
CALCIUM SERPL-MCNC: 9.2 MG/DL
CAST: 1 /LPF
CHLORIDE SERPL-SCNC: 104 MMOL/L
CHOLEST SERPL-MCNC: 229 MG/DL
CO2 SERPL-SCNC: 25 MMOL/L
COLOR: YELLOW
CREAT SERPL-MCNC: 0.55 MG/DL
EGFR: 117 ML/MIN/1.73M2
EPITHELIAL CELLS: 16 /HPF
ESTIMATED AVERAGE GLUCOSE: 103 MG/DL
FOLATE SERPL-MCNC: 19.2 NG/ML
GLUCOSE QUALITATIVE U: NEGATIVE MG/DL
GLUCOSE SERPL-MCNC: 85 MG/DL
HBA1C MFR BLD HPLC: 5.2 %
HDLC SERPL-MCNC: 74 MG/DL
KETONES URINE: NEGATIVE MG/DL
LDLC SERPL CALC-MCNC: 137 MG/DL
LEUKOCYTE ESTERASE URINE: NEGATIVE
MICROSCOPIC-UA: NORMAL
NITRITE URINE: NEGATIVE
NONHDLC SERPL-MCNC: 156 MG/DL
PH URINE: 7.5
POTASSIUM SERPL-SCNC: 3.9 MMOL/L
PROT SERPL-MCNC: 7.4 G/DL
PROTEIN URINE: NEGATIVE MG/DL
RED BLOOD CELLS URINE: 7 /HPF
SODIUM SERPL-SCNC: 140 MMOL/L
SPECIFIC GRAVITY URINE: 1.02
TRIGL SERPL-MCNC: 108 MG/DL
TSH SERPL-ACNC: 1.31 UIU/ML
UROBILINOGEN URINE: 0.2 MG/DL
VIT B12 SERPL-MCNC: 590 PG/ML
WHITE BLOOD CELLS URINE: 3 /HPF

## 2023-09-20 ENCOUNTER — NON-APPOINTMENT (OUTPATIENT)
Age: 43
End: 2023-09-20

## 2023-09-21 ENCOUNTER — TRANSCRIPTION ENCOUNTER (OUTPATIENT)
Age: 43
End: 2023-09-21

## 2023-11-03 ENCOUNTER — NON-APPOINTMENT (OUTPATIENT)
Age: 43
End: 2023-11-03

## 2024-02-28 ENCOUNTER — TRANSCRIPTION ENCOUNTER (OUTPATIENT)
Age: 44
End: 2024-02-28

## 2024-03-10 PROBLEM — N30.10 CHRONIC INTERSTITIAL CYSTITIS: Status: ACTIVE | Noted: 2018-12-11

## 2024-04-04 ENCOUNTER — APPOINTMENT (OUTPATIENT)
Dept: CARDIOLOGY | Facility: CLINIC | Age: 44
End: 2024-04-04
Payer: COMMERCIAL

## 2024-04-04 ENCOUNTER — NON-APPOINTMENT (OUTPATIENT)
Age: 44
End: 2024-04-04

## 2024-04-04 VITALS
WEIGHT: 165 LBS | HEART RATE: 76 BPM | DIASTOLIC BLOOD PRESSURE: 99 MMHG | HEIGHT: 60 IN | OXYGEN SATURATION: 99 % | BODY MASS INDEX: 32.39 KG/M2 | SYSTOLIC BLOOD PRESSURE: 143 MMHG

## 2024-04-04 VITALS — DIASTOLIC BLOOD PRESSURE: 86 MMHG | SYSTOLIC BLOOD PRESSURE: 130 MMHG

## 2024-04-04 DIAGNOSIS — I10 ESSENTIAL (PRIMARY) HYPERTENSION: ICD-10-CM

## 2024-04-04 PROCEDURE — 93000 ELECTROCARDIOGRAM COMPLETE: CPT

## 2024-04-04 PROCEDURE — 99214 OFFICE O/P EST MOD 30 MIN: CPT

## 2024-04-04 PROCEDURE — G2211 COMPLEX E/M VISIT ADD ON: CPT

## 2024-04-04 NOTE — DISCUSSION/SUMMARY
[FreeTextEntry1] : This is a 43 year old woman with a history of obesity, hypertension, anxiety, asthma who presents to the office for a cardiac evaluation.   Her BP is now controlled on amlodipine 10 QD.  She will continue this dose.   We discussed the benefits of a healthy diet, regular exercise and physical activity, and weight loss in detail.  She will follow in six months.  [EKG obtained to assist in diagnosis and management of assessed problem(s)] : EKG obtained to assist in diagnosis and management of assessed problem(s)

## 2024-04-04 NOTE — HISTORY OF PRESENT ILLNESS
[FreeTextEntry1] : This is a 43 year old woman with a history of obesity, hypertension, anxiety, asthma who presents to the office for a cardiac evaluation.     She is not reporting any cardiac symptoms, with no chest pains, increased dyspnea, PND, orthopnea, LE swelling, dizziness, or syncope.  Her BP is now controlled on amlodipine 10 QD when she checks it at home.

## 2024-05-17 ENCOUNTER — TRANSCRIPTION ENCOUNTER (OUTPATIENT)
Age: 44
End: 2024-05-17

## 2024-06-09 ENCOUNTER — NON-APPOINTMENT (OUTPATIENT)
Age: 44
End: 2024-06-09

## 2024-06-25 ENCOUNTER — RX RENEWAL (OUTPATIENT)
Age: 44
End: 2024-06-25

## 2024-06-25 RX ORDER — ESCITALOPRAM OXALATE 10 MG/1
10 TABLET ORAL
Qty: 90 | Refills: 2 | Status: ACTIVE | COMMUNITY
Start: 2021-11-12 | End: 1900-01-01

## 2024-07-02 ENCOUNTER — TRANSCRIPTION ENCOUNTER (OUTPATIENT)
Age: 44
End: 2024-07-02

## 2024-07-08 ENCOUNTER — APPOINTMENT (OUTPATIENT)
Dept: ORTHOPEDIC SURGERY | Facility: CLINIC | Age: 44
End: 2024-07-08

## 2024-07-31 NOTE — ED ADULT NURSE NOTE - BREATH SOUNDS, MLM
well developed, well nourished , in no acute distress , ambulating without difficulty , normal communication ability Clear

## 2024-08-13 ENCOUNTER — RX RENEWAL (OUTPATIENT)
Age: 44
End: 2024-08-13

## 2024-08-22 ENCOUNTER — NON-APPOINTMENT (OUTPATIENT)
Age: 44
End: 2024-08-22

## 2024-10-04 ENCOUNTER — APPOINTMENT (OUTPATIENT)
Dept: CARDIOLOGY | Facility: CLINIC | Age: 44
End: 2024-10-04
Payer: COMMERCIAL

## 2024-10-04 ENCOUNTER — NON-APPOINTMENT (OUTPATIENT)
Age: 44
End: 2024-10-04

## 2024-10-04 VITALS
DIASTOLIC BLOOD PRESSURE: 85 MMHG | HEIGHT: 60 IN | BODY MASS INDEX: 36.12 KG/M2 | SYSTOLIC BLOOD PRESSURE: 135 MMHG | OXYGEN SATURATION: 100 % | HEART RATE: 79 BPM | WEIGHT: 184 LBS

## 2024-10-04 DIAGNOSIS — R94.31 ABNORMAL ELECTROCARDIOGRAM [ECG] [EKG]: ICD-10-CM

## 2024-10-04 PROCEDURE — 93000 ELECTROCARDIOGRAM COMPLETE: CPT

## 2024-10-04 PROCEDURE — G2211 COMPLEX E/M VISIT ADD ON: CPT | Mod: NC

## 2024-10-04 PROCEDURE — 99214 OFFICE O/P EST MOD 30 MIN: CPT

## 2024-10-04 NOTE — DISCUSSION/SUMMARY
[FreeTextEntry1] : This is a 44 year old woman with a history of obesity, hypertension, anxiety, asthma who presents to the office for a cardiac evaluation.   Her BP is now controlled on amlodipine 10 QD.  She will continue this dose.   We discussed the benefits of a healthy diet, regular exercise and physical activity, and weight loss in detail.  She will follow in six months.  I am sending a full set of fasting blood work.  [EKG obtained to assist in diagnosis and management of assessed problem(s)] : EKG obtained to assist in diagnosis and management of assessed problem(s)

## 2024-10-04 NOTE — HISTORY OF PRESENT ILLNESS
[FreeTextEntry1] : This is a 44 year old woman with a history of obesity, hypertension, anxiety, asthma who presents to the office for a cardiac evaluation.     She is not reporting any cardiac symptoms, with no chest pains, increased dyspnea, PND, orthopnea, LE swelling, dizziness, or syncope.  Her BP is now controlled on amlodipine 10 QD when she checks it at home.  She has gained weight, a process she knows that she needs to reverse.

## 2024-10-10 ENCOUNTER — TRANSCRIPTION ENCOUNTER (OUTPATIENT)
Age: 44
End: 2024-10-10

## 2024-10-12 LAB
25(OH)D3 SERPL-MCNC: 17.8 NG/ML
ALBUMIN SERPL ELPH-MCNC: 4.1 G/DL
ALP BLD-CCNC: 45 U/L
ALT SERPL-CCNC: 13 U/L
ANION GAP SERPL CALC-SCNC: 10 MMOL/L
AST SERPL-CCNC: 17 U/L
BILIRUB SERPL-MCNC: 0.3 MG/DL
BUN SERPL-MCNC: 8 MG/DL
CALCIUM SERPL-MCNC: 8.7 MG/DL
CHLORIDE SERPL-SCNC: 104 MMOL/L
CHOLEST SERPL-MCNC: 213 MG/DL
CO2 SERPL-SCNC: 26 MMOL/L
CREAT SERPL-MCNC: 0.64 MG/DL
EGFR: 112 ML/MIN/1.73M2
ESTIMATED AVERAGE GLUCOSE: 103 MG/DL
FOLATE SERPL-MCNC: 15.7 NG/ML
GLUCOSE SERPL-MCNC: 92 MG/DL
HBA1C MFR BLD HPLC: 5.2 %
HCT VFR BLD CALC: 38.8 %
HDLC SERPL-MCNC: 59 MG/DL
HGB BLD-MCNC: 12.6 G/DL
LDLC SERPL CALC-MCNC: 137 MG/DL
MCHC RBC-ENTMCNC: 30.2 PG
MCHC RBC-ENTMCNC: 32.5 GM/DL
MCV RBC AUTO: 93 FL
NONHDLC SERPL-MCNC: 154 MG/DL
PLATELET # BLD AUTO: 134 K/UL
POTASSIUM SERPL-SCNC: 4 MMOL/L
PROT SERPL-MCNC: 6.7 G/DL
RBC # BLD: 4.17 M/UL
RBC # FLD: 12.1 %
SODIUM SERPL-SCNC: 140 MMOL/L
TRIGL SERPL-MCNC: 96 MG/DL
TSH SERPL-ACNC: 1.15 UIU/ML
VIT B12 SERPL-MCNC: 362 PG/ML
WBC # FLD AUTO: 5.13 K/UL

## 2024-10-23 ENCOUNTER — TRANSCRIPTION ENCOUNTER (OUTPATIENT)
Age: 44
End: 2024-10-23

## 2024-10-25 ENCOUNTER — OUTPATIENT (OUTPATIENT)
Dept: OUTPATIENT SERVICES | Facility: HOSPITAL | Age: 44
LOS: 1 days | End: 2024-10-25
Payer: COMMERCIAL

## 2024-10-25 ENCOUNTER — APPOINTMENT (OUTPATIENT)
Dept: ULTRASOUND IMAGING | Facility: HOSPITAL | Age: 44
End: 2024-10-25

## 2024-10-25 DIAGNOSIS — Z90.89 ACQUIRED ABSENCE OF OTHER ORGANS: Chronic | ICD-10-CM

## 2024-10-25 DIAGNOSIS — E04.1 NONTOXIC SINGLE THYROID NODULE: ICD-10-CM

## 2024-10-25 PROCEDURE — 76536 US EXAM OF HEAD AND NECK: CPT | Mod: 26

## 2024-10-25 PROCEDURE — 76536 US EXAM OF HEAD AND NECK: CPT

## 2024-12-02 ENCOUNTER — NON-APPOINTMENT (OUTPATIENT)
Age: 44
End: 2024-12-02

## 2024-12-03 ENCOUNTER — APPOINTMENT (OUTPATIENT)
Dept: CARDIOLOGY | Facility: CLINIC | Age: 44
End: 2024-12-03

## 2024-12-03 ENCOUNTER — TRANSCRIPTION ENCOUNTER (OUTPATIENT)
Age: 44
End: 2024-12-03

## 2024-12-04 ENCOUNTER — TRANSCRIPTION ENCOUNTER (OUTPATIENT)
Age: 44
End: 2024-12-04

## 2024-12-07 ENCOUNTER — NON-APPOINTMENT (OUTPATIENT)
Age: 44
End: 2024-12-07

## 2024-12-10 ENCOUNTER — TRANSCRIPTION ENCOUNTER (OUTPATIENT)
Age: 44
End: 2024-12-10

## 2024-12-12 ENCOUNTER — TRANSCRIPTION ENCOUNTER (OUTPATIENT)
Age: 44
End: 2024-12-12

## 2024-12-16 ENCOUNTER — TRANSCRIPTION ENCOUNTER (OUTPATIENT)
Age: 44
End: 2024-12-16

## 2024-12-18 ENCOUNTER — APPOINTMENT (OUTPATIENT)
Dept: OTOLARYNGOLOGY | Facility: CLINIC | Age: 44
End: 2024-12-18

## 2024-12-18 ENCOUNTER — APPOINTMENT (OUTPATIENT)
Dept: INTERNAL MEDICINE | Facility: CLINIC | Age: 44
End: 2024-12-18
Payer: COMMERCIAL

## 2024-12-18 VITALS
SYSTOLIC BLOOD PRESSURE: 120 MMHG | BODY MASS INDEX: 32.39 KG/M2 | HEART RATE: 88 BPM | DIASTOLIC BLOOD PRESSURE: 78 MMHG | WEIGHT: 165 LBS | RESPIRATION RATE: 12 BRPM | OXYGEN SATURATION: 98 % | HEIGHT: 60 IN

## 2024-12-18 DIAGNOSIS — I10 ESSENTIAL (PRIMARY) HYPERTENSION: ICD-10-CM

## 2024-12-18 DIAGNOSIS — F41.9 ANXIETY DISORDER, UNSPECIFIED: ICD-10-CM

## 2024-12-18 PROCEDURE — G2211 COMPLEX E/M VISIT ADD ON: CPT | Mod: NC

## 2024-12-18 PROCEDURE — 99214 OFFICE O/P EST MOD 30 MIN: CPT

## 2024-12-31 ENCOUNTER — APPOINTMENT (OUTPATIENT)
Dept: OTOLARYNGOLOGY | Facility: CLINIC | Age: 44
End: 2024-12-31

## 2025-01-24 ENCOUNTER — APPOINTMENT (OUTPATIENT)
Dept: CARDIOLOGY | Facility: CLINIC | Age: 45
End: 2025-01-24

## 2025-02-27 ENCOUNTER — APPOINTMENT (OUTPATIENT)
Dept: INTERNAL MEDICINE | Facility: CLINIC | Age: 45
End: 2025-02-27
Payer: COMMERCIAL

## 2025-02-27 VITALS
WEIGHT: 181 LBS | OXYGEN SATURATION: 99 % | DIASTOLIC BLOOD PRESSURE: 82 MMHG | RESPIRATION RATE: 14 BRPM | HEIGHT: 60 IN | TEMPERATURE: 97.5 F | BODY MASS INDEX: 35.53 KG/M2 | HEART RATE: 86 BPM | SYSTOLIC BLOOD PRESSURE: 128 MMHG

## 2025-02-27 DIAGNOSIS — Z00.00 ENCOUNTER FOR GENERAL ADULT MEDICAL EXAMINATION W/OUT ABNORMAL FINDINGS: ICD-10-CM

## 2025-02-27 DIAGNOSIS — I10 ESSENTIAL (PRIMARY) HYPERTENSION: ICD-10-CM

## 2025-02-27 PROCEDURE — 99396 PREV VISIT EST AGE 40-64: CPT

## 2025-04-29 ENCOUNTER — APPOINTMENT (OUTPATIENT)
Dept: CARDIOLOGY | Facility: CLINIC | Age: 45
End: 2025-04-29

## 2025-06-06 ENCOUNTER — APPOINTMENT (OUTPATIENT)
Dept: CARDIOLOGY | Facility: CLINIC | Age: 45
End: 2025-06-06

## 2025-07-21 ENCOUNTER — TRANSCRIPTION ENCOUNTER (OUTPATIENT)
Age: 45
End: 2025-07-21

## 2025-08-13 ENCOUNTER — TRANSCRIPTION ENCOUNTER (OUTPATIENT)
Age: 45
End: 2025-08-13

## 2025-08-25 ENCOUNTER — TRANSCRIPTION ENCOUNTER (OUTPATIENT)
Age: 45
End: 2025-08-25

## 2025-08-26 ENCOUNTER — RX RENEWAL (OUTPATIENT)
Age: 45
End: 2025-08-26

## 2025-09-02 ENCOUNTER — TRANSCRIPTION ENCOUNTER (OUTPATIENT)
Age: 45
End: 2025-09-02

## 2025-09-11 ENCOUNTER — TRANSCRIPTION ENCOUNTER (OUTPATIENT)
Age: 45
End: 2025-09-11

## 2025-09-11 RX ORDER — TIRZEPATIDE 2.5 MG/.5ML
2.5 INJECTION, SOLUTION SUBCUTANEOUS
Qty: 1 | Refills: 0 | Status: ACTIVE | COMMUNITY
Start: 2025-09-11

## 2025-09-18 ENCOUNTER — TRANSCRIPTION ENCOUNTER (OUTPATIENT)
Age: 45
End: 2025-09-18